# Patient Record
Sex: MALE | Race: WHITE | Employment: OTHER | URBAN - METROPOLITAN AREA
[De-identification: names, ages, dates, MRNs, and addresses within clinical notes are randomized per-mention and may not be internally consistent; named-entity substitution may affect disease eponyms.]

---

## 2017-01-24 ENCOUNTER — TELEPHONE (OUTPATIENT)
Dept: RADIATION THERAPY | Facility: OUTPATIENT CENTER | Age: 82
End: 2017-01-24

## 2017-01-24 NOTE — TELEPHONE ENCOUNTER
Late Entry - outgoing call made to Ramon by scheduling team as he had missed most recent follow up in radiation oncology. Patient stated at this time he is not able to follow up and has other health issues that he feels are more pressing. He does not want to schedule any future follow ups. Scheduling team stated they would notify the doctor of this and asked if the doctor or nurse could call back if there were questions.    MD made aware. He noted that patient should be followed by PCP if he is not able or willing to be seen here. Patient can be seen on an as needed bases.

## 2017-02-07 ENCOUNTER — NURSING HOME VISIT (OUTPATIENT)
Dept: GERIATRICS | Facility: CLINIC | Age: 82
End: 2017-02-07
Payer: COMMERCIAL

## 2017-02-07 VITALS
TEMPERATURE: 98.9 F | SYSTOLIC BLOOD PRESSURE: 109 MMHG | BODY MASS INDEX: 25.68 KG/M2 | HEART RATE: 73 BPM | DIASTOLIC BLOOD PRESSURE: 49 MMHG | WEIGHT: 179 LBS | RESPIRATION RATE: 18 BRPM | OXYGEN SATURATION: 94 %

## 2017-02-07 DIAGNOSIS — L89.150 DECUBITUS ULCER OF COCCYGEAL REGION, UNSTAGEABLE (H): ICD-10-CM

## 2017-02-07 DIAGNOSIS — I25.10 CORONARY ARTERY DISEASE INVOLVING NATIVE CORONARY ARTERY OF NATIVE HEART WITHOUT ANGINA PECTORIS: ICD-10-CM

## 2017-02-07 DIAGNOSIS — Z95.2 S/P TAVR (TRANSCATHETER AORTIC VALVE REPLACEMENT): ICD-10-CM

## 2017-02-07 DIAGNOSIS — I50.21 ACUTE SYSTOLIC CONGESTIVE HEART FAILURE (H): Primary | ICD-10-CM

## 2017-02-07 PROCEDURE — 99306 1ST NF CARE HIGH MDM 50: CPT | Performed by: FAMILY MEDICINE

## 2017-02-07 PROCEDURE — 99207 ZZC CDG-CORRECTLY CODED, REVIEWED AND AGREE: CPT | Performed by: FAMILY MEDICINE

## 2017-02-07 NOTE — PROGRESS NOTES
[facility]  PRIMARY CARE PROVIDER AND CLINIC:  [facility] [address] [facility] [address] [facility] [address]  Chief Complaint   Patient presents with     Hospital F/U     Nursing Home Acute       HPI:    Ramon Mora is a 83 year old  (7/17/1933),admitted to the [facility] from [facility] .  Hospital stay 1 through 2/3/17.  Admitted to this facility for  rehab, medical management and nursing care. Current issues are:      1. Acute systolic congestive heart failure (H)    2. Coronary artery disease involving native coronary artery of native heart without angina pectoris    3. S/P TAVR (transcatheter aortic valve replacement)    4. Decubitus ulcer of coccygeal region, unstageable (H)         Ramon Mora is a 83 y.o. male with a past medical history pertinent for systolic CHF, type II diabetes, hypertension, hyperlipidemia, CKD stage III who is admitted to [facility] on 01/24/17 after presenting to [facility] with 1 month history of leg swelling. He visited his PCP multiple times within the past month for this problem (12/19, 1/4, 1/18) and his diuretic was adjusted each time without relief (most recently on 40mg torsemide BID).     On 1/21, the patient presented to [facility] ED where he was admitted for acute on chronic systolic heart failure. Patient diuresed with 80mg IV lasix q8h and started on clindamycin(?lower extremity cellulitis). Echo showed EF of 10-20% and moderate aortic stenosis (previous echo 9/2016 with EF 27%). Cardiology consulted and patient was transferred to [facility] on 1/24 for bilateral heart catheterization, valve study coronary angiography, graft study, possible PCI as well as PA catheter guided CHF management.      Patient underwent coronary angiogram 1/25 which showed findings consistent with low flow low gradient aortic stenosis with contractile reserve in the setting of ischemic cardiomyopathy and advanced three  vessel CAD. CV surgery and complex valve team consulted for evaluation for valve replacement. Given high risk reoperative AVR, patient's best option was for felt to be a TAVR. Cardiology placed swan on 1/30 to optimize cardiac status periopertaviely,and he underwent right transfemoral transcatheter aortic valve replacement on 1/31. IV diuresis postop was done and patient remained on isordil/hydralazine and nitro infusion. Follow up echo 2/1/17 showed similar LV function to prior and normal prosthetic valve function. Patient was able to wean off intro infusion and converted to oral diuretics upon discharge. Cardiology also added spironolactone and hydralazine(ace inhibitor on hold until sure creatinine is stable) He will continue plavix per cardiology      CODE STATUS/ADVANCE DIRECTIVES DISCUSSION:   DNR / DNI  Patient's living condition: lives with spouse    ALLERGIES:Review of patient's allergies indicates no known allergies.  PAST MEDICAL HISTORY:  has a past medical history of Coronary artery disease; Diabetes mellitus (H); and Malignant neoplasm (H).  PAST SURGICAL HISTORY:  has past surgical history that includes Cardiac surgery; orthopedic surgery (1994); ENT surgery; appendectomy; and Eye surgery.  FAMILY HISTORY: family history includes Cancer - colorectal (age of onset: 64) in his brother.  SOCIAL HISTORY:  reports that he has quit smoking. He has never used smokeless tobacco. He reports that he drinks alcohol. He reports that he does not use illicit drugs.    Post Discharge Medication Reconciliation Status: discharge medications reconciled and changed, per note/orders (see AVS).  Current Outpatient Prescriptions   Medication Sig Dispense Refill     Multiple Vitamins-Minerals (CENTRUM SILVER) per tablet Take 1 tablet by mouth daily       CLOPIDOGREL BISULFATE PO Take 75 mg by mouth daily       FERROUS GLUCONATE PO Take 324 mg by mouth daily (with breakfast)       HYDRALAZINE HCL PO Take 10 mg by mouth every  8 hours       polyethylene glycol (MIRALAX/GLYCOLAX) powder Take 1 capful by mouth daily       SPIRONOLACTONE PO Take 25 mg by mouth daily       TORSEMIDE PO Take 20 mg by mouth daily       triamcinolone (KENALOG) 0.1 % cream Apply topically 2 times daily       Atorvastatin Calcium (LIPITOR PO) Take 40 mg by mouth daily       METOPROLOL SUCCINATE ER PO Take 25 mg by mouth daily        isosorbide mononitrate (IMDUR) 60 MG 24 hr tablet Take 30 mg by mouth daily        nitroglycerin (NITROSTAT) 0.4 MG SL tablet Place  under the tongue every 5 minutes as needed.       pantoprazole sodium (PROTONIX) 40 MG tablet Take 1 packet by mouth daily.       aspirin 81 MG tablet Take  by mouth daily.         ROS:  10 point ROS of systems including Constitutional, Eyes, Respiratory, Cardiovascular, Gastroenterology, Genitourinary, Integumentary, Muscularskeletal, Psychiatric were all negative except for pertinent positives noted in my HPI.    Exam:  /49 mmHg  Pulse 73  Temp(Src) 98.9  F (37.2  C)  Resp 18  Wt 179 lb (81.194 kg)  SpO2 94%    Gen: sitting in chair, alert, cooperative and in no acute distress  HEENT: normocephalic; oropharynx clear  Card: RRR, S1, S2, no murmurs  Resp: lungs clear to auscultation bilaterally, no wheezes or crackles  GI: abdomen soft, not-tender  MSK: normal muscle tone, no LE edema  Neuro: CX II-XII grossly in tact; ROM in all four extremities grossly in tact  Psych: alert and oriented x3; normal affect  Skin: Sacral wound - 1.2 cm x 0.6 x 0.6 cm-- 90% slough, no odor         Lab/Diagnostic data:   Date:  2/6/17  Hgb 10.4, Hct 33.3, WBC 6.5    Date:  2/6/17  Na 140, K+ 4.6, BUN 30, Creat 1.10, eGFR >60  Ca 8.6    Blood sugars-  2/4  115 & 172  2/5  113 & 168         ASSESSMENT/PLAN:  1. Acute systolic congestive heart failure (H)    2. Coronary artery disease involving native coronary artery of native heart without angina pectoris    3. S/P TAVR (transcatheter aortic valve replacement)    4.  Decubitus ulcer of coccygeal region, unstageable (H)          Orders:  1. DC glipizide if still on med.  2. F/U with CV surgery in 4 weeks S/P TAVR Dx Acute Systolic CHF stage 4 NYHA  3. Sacral ulcer- continue wound cares  4. Check CBC, CMP and Mg next lab day Dx CHF  5. HNS glucerna 4 oz po BID to aid in wound healing      Information reviewed:  Medications, vital signs, orders, nursing notes, problem list, hospital information. Total time spent with patient visit was 45 min including patient visit and review of past records. Greater than 50% of total time spent with counseling and coordinating care.    Electronically signed by:  Tami Menjivar MD, MD

## 2017-02-08 ENCOUNTER — TRANSFERRED RECORDS (OUTPATIENT)
Dept: HEALTH INFORMATION MANAGEMENT | Facility: CLINIC | Age: 82
End: 2017-02-08

## 2017-02-08 LAB
ALT SERPL-CCNC: 12 U/L (ref 0–45)
AST SERPL-CCNC: 16 U/L (ref 0–40)
CREAT SERPL-MCNC: 1.16 MG/DL (ref 0.7–1.3)
GFR SERPL CREATININE-BSD FRML MDRD: >60 ML/MIN/1.73M2
GLUCOSE SERPL-MCNC: 101 MG/DL (ref 70–125)
POTASSIUM SERPL-SCNC: 4.4 MMOL/L (ref 3.5–5)

## 2017-02-16 ENCOUNTER — NURSING HOME VISIT (OUTPATIENT)
Dept: GERIATRICS | Facility: CLINIC | Age: 82
End: 2017-02-16
Payer: COMMERCIAL

## 2017-02-16 VITALS
RESPIRATION RATE: 24 BRPM | DIASTOLIC BLOOD PRESSURE: 63 MMHG | SYSTOLIC BLOOD PRESSURE: 130 MMHG | TEMPERATURE: 98 F | BODY MASS INDEX: 25.68 KG/M2 | WEIGHT: 179 LBS | OXYGEN SATURATION: 94 % | HEART RATE: 67 BPM

## 2017-02-16 DIAGNOSIS — I25.10 CORONARY ARTERY DISEASE INVOLVING NATIVE CORONARY ARTERY OF NATIVE HEART WITHOUT ANGINA PECTORIS: ICD-10-CM

## 2017-02-16 DIAGNOSIS — I50.21 ACUTE SYSTOLIC CONGESTIVE HEART FAILURE (H): Primary | ICD-10-CM

## 2017-02-16 DIAGNOSIS — E08.22 DIABETES MELLITUS DUE TO UNDERLYING CONDITION WITH STAGE 3 CHRONIC KIDNEY DISEASE, WITHOUT LONG-TERM CURRENT USE OF INSULIN (H): ICD-10-CM

## 2017-02-16 DIAGNOSIS — L89.150 DECUBITUS ULCER OF COCCYGEAL REGION, UNSTAGEABLE (H): ICD-10-CM

## 2017-02-16 DIAGNOSIS — Z95.2 S/P TAVR (TRANSCATHETER AORTIC VALVE REPLACEMENT): ICD-10-CM

## 2017-02-16 DIAGNOSIS — N18.30 DIABETES MELLITUS DUE TO UNDERLYING CONDITION WITH STAGE 3 CHRONIC KIDNEY DISEASE, WITHOUT LONG-TERM CURRENT USE OF INSULIN (H): ICD-10-CM

## 2017-02-16 PROCEDURE — 99207 ZZC CDG-CORRECTLY CODED, REVIEWED AND AGREE: CPT | Performed by: FAMILY MEDICINE

## 2017-02-16 PROCEDURE — 99316 NF DSCHRG MGMT 30 MIN+: CPT | Performed by: FAMILY MEDICINE

## 2017-02-16 NOTE — PROGRESS NOTES
[facility] DISCHARGE SUMMARY    PATIENT'S NAME: Ramon Mora  YOB: 1933  MEDICAL RECORD NUMBER:  [id number]    PRIMARY CARE PROVIDER AND CLINIC RESPONSIBLE AFTER TRANSFER: [facility] [address] [facility] [address]     CODE STATUS/ADVANCE DIRECTIVES DISCUSSION:   DNR / DNI     No Known Allergies    TRANSFERRING PROVIDERS: CHRISTINE Castillo   DATE OF SNF ADMISSION:  February / 03 / 2017  DATE OF SNF (anticipated) DISCHARGE: February / 16 / 2017  DISCHARGE DISPOSITION: [facility]   [facility]ursing Facility: [facility]  stay 1/24/17 to 2/3/17.     Condition on Discharge:  Improving.  Function:  indpendent   Cognitive Scores: CPT 4.8    Equipment: walker    DISCHARGE DIAGNOSIS:   1. Acute systolic congestive heart failure (H)    2. Coronary artery disease involving native coronary artery of native heart without angina pectoris    3. S/P TAVR (transcatheter aortic valve replacement)        [facility] Course:  Patient was admitted to facility after hospitalization for Acute CHF, Severe Aortic Valve Stenosis ans S/P TAVR  Patient participated in PT/OT. Patient will discharge home with in home services of PT/RN through [facility].      PAST MEDICAL HISTORY:  has a past medical history of Coronary artery disease; Diabetes mellitus (H); and Malignant neoplasm (H).    DISCHARGE MEDICATIONS:  Current Outpatient Prescriptions   Medication Sig Dispense Refill     Multiple Vitamins-Minerals (CENTRUM SILVER) per tablet Take 1 tablet by mouth daily       CLOPIDOGREL BISULFATE PO Take 75 mg by mouth daily       FERROUS GLUCONATE PO Take 324 mg by mouth daily (with breakfast)       HYDRALAZINE HCL PO Take 10 mg by mouth every 8 hours       polyethylene glycol (MIRALAX/GLYCOLAX) powder Take 1 capful by mouth daily       SPIRONOLACTONE PO Take 25 mg by mouth daily       TORSEMIDE PO Take 20 mg  by mouth daily       triamcinolone (KENALOG) 0.1 % cream Apply topically 2 times daily       Atorvastatin Calcium (LIPITOR PO) Take 40 mg by mouth daily       METOPROLOL SUCCINATE ER PO Take 25 mg by mouth daily        isosorbide mononitrate (IMDUR) 60 MG 24 hr tablet Take 30 mg by mouth daily        nitroglycerin (NITROSTAT) 0.4 MG SL tablet Place  under the tongue every 5 minutes as needed.       pantoprazole sodium (PROTONIX) 40 MG tablet Take 1 packet by mouth daily.       aspirin 81 MG tablet Take  by mouth daily.         MEDICATION CHANGES/RATIONALE:   none       ROS:    10 point ROS of systems including Constitutional, Eyes, Respiratory, Cardiovascular, Gastroenterology, Genitourinary, Integumentary, Muscularskeletal, Psychiatric were all negative except for pertinent positives noted in my HPI.    Physical Exam:   Vitals: /63  Pulse 67  Temp 98  F (36.7  C)  Resp 24  Wt 179 lb (81.2 kg)  SpO2 94%  BMI 25.68 kg/m2  BMI= Body mass index is 25.68 kg/(m^2).    Gen: sitting in chair, alert, cooperative and in no acute distress  HEENT: normocephalic; oropharynx clear  Card: RRR, S1, S2, no murmurs  Resp: lungs clear to auscultation bilaterally, no wheezes or crackles  GI: abdomen soft, not-tender  MSK: normal muscle tone, no LE edema  Neuro: CX II-XII grossly in tact; ROM in all four extremities grossly in tact  Psych: alert and oriented x3; normal affect  Skin: Sacral wound - 1.2 cm x 0.6 x 0.6 cm-- 90% slough, no odor       DISCHARGE PLAN:  Physical Therapy, Registered Nurse and From:  [facility]  Patient instructed to follow-up with:  PCP in 1-2 weeks of discharge       [facility] scheduled appointments:      Pending labs: none    SNF labs   Date:  2/8/17  Na 140, K+ 4.4, BUN 31, Creat 1.16, eGFR 60  Ca 8.6        Discharge Treatments: continue with current wound care orders    1. No new orders    TOTAL DISCHARGE TIME:   Greater than 30 minutes  Electronically signed by:  Tami Menjivar,  MD KENNEDY    Documentation of Face to Face and Certification for Home Health Services    I certify that patient: Ramon Mora is under my care and that I, or a nurse practitioner or physician's assistant working with me, had a face-to-face encounter that meets the physician face-to-face encounter requirements with this patient on: 2/16/2017.    This encounter with the patient was in whole, or in part, for the following medical condition, which is the primary reason for home health care: Acute CHF, Severe Aortic Valve Stenosis ans S/P TAVR .    I certify that, based on my findings, the following services are medically necessary home health services: Nursing and Physical Therapy.    My clinical findings support the need for the above services because: Nurse is needed: To provide caregiver training to assist with:  Acute CHF, Severe Aortic Valve Stenosis ans S/P TAVR.. and Physical Therapy Services are needed to assess and treat the following functional impairments:  Acute CHF, Severe Aortic Valve Stenosis ans S/P TAVR.    Further, I certify that my clinical findings support that this patient is homebound (i.e. absences from home require considerable and taxing effort and are for medical reasons or Holiness services or infrequently or of short duration when for other reasons) because: Leaving home is medically contraindicated for the following reason(s): Pressure on open wounds during transport impairs healing process...    Based on the above findings. I certify that this patient is confined to the home and needs intermittent skilled nursing care, physical therapy and/or speech therapy.  The patient is under my care, and I have initiated the establishment of the plan of care.  This patient will be followed by a physician who will periodically review the plan of care.  Physician/Provider to provide follow up care: [facility]    Attending hospital physician (the Medicare certified [facility] provider): Tami Coley  MD Otto  Physician Signature: See electronic signature associated with these discharge orders.  Date: 2/16/2017

## 2018-01-01 ENCOUNTER — OFFICE VISIT (OUTPATIENT)
Dept: DERMATOLOGY | Facility: CLINIC | Age: 83
End: 2018-01-01
Payer: COMMERCIAL

## 2018-01-01 ENCOUNTER — NURSING HOME VISIT (OUTPATIENT)
Dept: FAMILY MEDICINE | Facility: CLINIC | Age: 83
End: 2018-01-01
Payer: COMMERCIAL

## 2018-01-01 ENCOUNTER — DISCHARGE SUMMARY NURSING HOME (OUTPATIENT)
Dept: FAMILY MEDICINE | Facility: CLINIC | Age: 83
End: 2018-01-01
Payer: COMMERCIAL

## 2018-01-01 ENCOUNTER — RESULTS ONLY (OUTPATIENT)
Dept: FAMILY MEDICINE | Facility: CLINIC | Age: 83
End: 2018-01-01

## 2018-01-01 ENCOUNTER — NURSING HOME VISIT (OUTPATIENT)
Dept: GERIATRICS | Facility: CLINIC | Age: 83
End: 2018-01-01
Payer: COMMERCIAL

## 2018-01-01 ENCOUNTER — HOSPITAL LABORATORY (OUTPATIENT)
Facility: OTHER | Age: 83
End: 2018-01-01

## 2018-01-01 VITALS
DIASTOLIC BLOOD PRESSURE: 65 MMHG | RESPIRATION RATE: 20 BRPM | WEIGHT: 171.8 LBS | BODY MASS INDEX: 24.6 KG/M2 | TEMPERATURE: 97.7 F | HEART RATE: 79 BPM | OXYGEN SATURATION: 92 % | SYSTOLIC BLOOD PRESSURE: 116 MMHG | HEIGHT: 70 IN

## 2018-01-01 VITALS
BODY MASS INDEX: 22.76 KG/M2 | OXYGEN SATURATION: 99 % | HEART RATE: 92 BPM | RESPIRATION RATE: 19 BRPM | TEMPERATURE: 97 F | WEIGHT: 158.6 LBS | DIASTOLIC BLOOD PRESSURE: 65 MMHG | SYSTOLIC BLOOD PRESSURE: 145 MMHG

## 2018-01-01 VITALS
DIASTOLIC BLOOD PRESSURE: 51 MMHG | OXYGEN SATURATION: 95 % | HEART RATE: 73 BPM | RESPIRATION RATE: 19 BRPM | BODY MASS INDEX: 24.6 KG/M2 | HEIGHT: 70 IN | WEIGHT: 171.8 LBS | SYSTOLIC BLOOD PRESSURE: 109 MMHG | TEMPERATURE: 96.6 F

## 2018-01-01 VITALS — SYSTOLIC BLOOD PRESSURE: 149 MMHG | OXYGEN SATURATION: 96 % | DIASTOLIC BLOOD PRESSURE: 72 MMHG | HEART RATE: 79 BPM

## 2018-01-01 VITALS
WEIGHT: 150 LBS | TEMPERATURE: 97.9 F | HEART RATE: 81 BPM | SYSTOLIC BLOOD PRESSURE: 104 MMHG | BODY MASS INDEX: 21.52 KG/M2 | RESPIRATION RATE: 16 BRPM | OXYGEN SATURATION: 94 % | DIASTOLIC BLOOD PRESSURE: 47 MMHG

## 2018-01-01 VITALS — HEART RATE: 59 BPM | SYSTOLIC BLOOD PRESSURE: 117 MMHG | OXYGEN SATURATION: 96 % | DIASTOLIC BLOOD PRESSURE: 59 MMHG

## 2018-01-01 VITALS
HEART RATE: 61 BPM | DIASTOLIC BLOOD PRESSURE: 79 MMHG | WEIGHT: 171.8 LBS | RESPIRATION RATE: 18 BRPM | SYSTOLIC BLOOD PRESSURE: 133 MMHG | OXYGEN SATURATION: 95 % | HEIGHT: 70 IN | TEMPERATURE: 98.1 F | BODY MASS INDEX: 24.6 KG/M2

## 2018-01-01 VITALS — SYSTOLIC BLOOD PRESSURE: 130 MMHG | HEART RATE: 64 BPM | OXYGEN SATURATION: 95 % | DIASTOLIC BLOOD PRESSURE: 53 MMHG

## 2018-01-01 VITALS
BODY MASS INDEX: 21.58 KG/M2 | DIASTOLIC BLOOD PRESSURE: 55 MMHG | SYSTOLIC BLOOD PRESSURE: 121 MMHG | HEIGHT: 70 IN | RESPIRATION RATE: 18 BRPM | HEART RATE: 71 BPM | OXYGEN SATURATION: 98 % | WEIGHT: 150.7 LBS | TEMPERATURE: 96.7 F

## 2018-01-01 VITALS
RESPIRATION RATE: 16 BRPM | BODY MASS INDEX: 21.49 KG/M2 | SYSTOLIC BLOOD PRESSURE: 130 MMHG | TEMPERATURE: 98 F | WEIGHT: 149.8 LBS | DIASTOLIC BLOOD PRESSURE: 58 MMHG | HEART RATE: 79 BPM | OXYGEN SATURATION: 95 %

## 2018-01-01 DIAGNOSIS — L89.153 PRESSURE INJURY OF SACRAL REGION, STAGE 3 (H): Primary | ICD-10-CM

## 2018-01-01 DIAGNOSIS — I50.22 CHRONIC SYSTOLIC CHF (CONGESTIVE HEART FAILURE) (H): ICD-10-CM

## 2018-01-01 DIAGNOSIS — G20.C: ICD-10-CM

## 2018-01-01 DIAGNOSIS — F02.80 LATE ONSET ALZHEIMER'S DISEASE WITHOUT BEHAVIORAL DISTURBANCE (H): ICD-10-CM

## 2018-01-01 DIAGNOSIS — L57.0 AK (ACTINIC KERATOSIS): ICD-10-CM

## 2018-01-01 DIAGNOSIS — I50.22 CHRONIC SYSTOLIC CONGESTIVE HEART FAILURE (H): ICD-10-CM

## 2018-01-01 DIAGNOSIS — D04.39 SQUAMOUS CELL CARCINOMA IN SITU OF SKIN OF LEFT CHEEK: Primary | ICD-10-CM

## 2018-01-01 DIAGNOSIS — N18.30 CKD (CHRONIC KIDNEY DISEASE), STAGE III (H): ICD-10-CM

## 2018-01-01 DIAGNOSIS — I25.10 ASHD (ARTERIOSCLEROTIC HEART DISEASE): Primary | ICD-10-CM

## 2018-01-01 DIAGNOSIS — D04.62 SQUAMOUS CELL CARCINOMA IN SITU OF SKIN OF FINGER OF LEFT HAND: ICD-10-CM

## 2018-01-01 DIAGNOSIS — G30.1 LATE ONSET ALZHEIMER'S DISEASE WITHOUT BEHAVIORAL DISTURBANCE (H): ICD-10-CM

## 2018-01-01 DIAGNOSIS — L89.150 DECUBITUS ULCER OF COCCYGEAL REGION, UNSTAGEABLE (H): ICD-10-CM

## 2018-01-01 DIAGNOSIS — E11.8 TYPE 2 DIABETES MELLITUS WITH COMPLICATION, WITHOUT LONG-TERM CURRENT USE OF INSULIN (H): ICD-10-CM

## 2018-01-01 DIAGNOSIS — C61 PROSTATE CANCER (H): ICD-10-CM

## 2018-01-01 DIAGNOSIS — Z48.02 ATTENTION TO DRESSINGS AND SUTURES: Primary | ICD-10-CM

## 2018-01-01 DIAGNOSIS — Z51.5 HOSPICE CARE PATIENT: ICD-10-CM

## 2018-01-01 DIAGNOSIS — N40.1 BENIGN NON-NODULAR PROSTATIC HYPERPLASIA WITH LOWER URINARY TRACT SYMPTOMS: ICD-10-CM

## 2018-01-01 DIAGNOSIS — I25.5 CARDIOMYOPATHY, ISCHEMIC: ICD-10-CM

## 2018-01-01 DIAGNOSIS — Z95.2 S/P TAVR (TRANSCATHETER AORTIC VALVE REPLACEMENT): ICD-10-CM

## 2018-01-01 DIAGNOSIS — I25.10 CORONARY ARTERY DISEASE INVOLVING NATIVE CORONARY ARTERY OF NATIVE HEART WITHOUT ANGINA PECTORIS: Primary | ICD-10-CM

## 2018-01-01 DIAGNOSIS — I25.10 ASHD (ARTERIOSCLEROTIC HEART DISEASE): ICD-10-CM

## 2018-01-01 DIAGNOSIS — R62.7 FAILURE TO THRIVE IN ADULT: ICD-10-CM

## 2018-01-01 DIAGNOSIS — C44.329 SQUAMOUS CELL CARCINOMA OF SKIN OF RIGHT TEMPLE: Primary | ICD-10-CM

## 2018-01-01 DIAGNOSIS — Z51.5 HOSPICE CARE PATIENT: Primary | ICD-10-CM

## 2018-01-01 DIAGNOSIS — Z48.00 ATTENTION TO DRESSINGS AND SUTURES: Primary | ICD-10-CM

## 2018-01-01 DIAGNOSIS — N18.30 DIABETES MELLITUS DUE TO UNDERLYING CONDITION WITH STAGE 3 CHRONIC KIDNEY DISEASE, WITHOUT LONG-TERM CURRENT USE OF INSULIN (H): ICD-10-CM

## 2018-01-01 DIAGNOSIS — G20.A1 PARKINSON'S DISEASE (H): ICD-10-CM

## 2018-01-01 DIAGNOSIS — D48.5 NEOPLASM OF UNCERTAIN BEHAVIOR OF SKIN: Primary | ICD-10-CM

## 2018-01-01 DIAGNOSIS — L89.152 PRESSURE INJURY OF SACRAL REGION, STAGE 2 (H): ICD-10-CM

## 2018-01-01 DIAGNOSIS — Z48.02 ENCOUNTER FOR REMOVAL OF SUTURES: Primary | ICD-10-CM

## 2018-01-01 DIAGNOSIS — R62.7 FAILURE TO THRIVE IN ADULT: Primary | ICD-10-CM

## 2018-01-01 DIAGNOSIS — I95.1: ICD-10-CM

## 2018-01-01 DIAGNOSIS — E11.8 TYPE 2 DIABETES MELLITUS WITH COMPLICATION, WITHOUT LONG-TERM CURRENT USE OF INSULIN (H): Primary | ICD-10-CM

## 2018-01-01 DIAGNOSIS — E08.22 DIABETES MELLITUS DUE TO UNDERLYING CONDITION WITH STAGE 3 CHRONIC KIDNEY DISEASE, WITHOUT LONG-TERM CURRENT USE OF INSULIN (H): ICD-10-CM

## 2018-01-01 DIAGNOSIS — N13.9 OBSTRUCTIVE UROPATHY: ICD-10-CM

## 2018-01-01 LAB
ALBUMIN UR-MCNC: NEGATIVE MG/DL
ALBUMIN UR-MCNC: NORMAL MG/DL
APPEARANCE UR: CLEAR
APPEARANCE UR: NORMAL
BACTERIA SPEC CULT: NO GROWTH
BACTERIA SPEC CULT: NORMAL
BILIRUB UR QL STRIP: NEGATIVE
BILIRUB UR QL STRIP: NORMAL
COLOR UR AUTO: NORMAL
COLOR UR AUTO: YELLOW
COPATH REPORT: NORMAL
GLUCOSE UR STRIP-MCNC: NEGATIVE MG/DL
GLUCOSE UR STRIP-MCNC: NORMAL MG/DL
HGB UR QL STRIP: NEGATIVE
HGB UR QL STRIP: NORMAL
HYALINE CASTS #/AREA URNS LPF: 27 /LPF (ref 0–2)
HYALINE CASTS #/AREA URNS LPF: NORMAL /LPF (ref 0–2)
KETONES UR STRIP-MCNC: NEGATIVE MG/DL
KETONES UR STRIP-MCNC: NORMAL MG/DL
LEUKOCYTE ESTERASE UR QL STRIP: NEGATIVE
LEUKOCYTE ESTERASE UR QL STRIP: NORMAL
Lab: NORMAL
NITRATE UR QL: NEGATIVE
NITRATE UR QL: NORMAL
PH UR STRIP: 6 PH (ref 5–7)
PH UR STRIP: NORMAL PH (ref 5–7)
RBC #/AREA URNS AUTO: 2 /HPF (ref 0–2)
RBC #/AREA URNS AUTO: NORMAL /HPF (ref 0–2)
SOURCE: NORMAL
SOURCE: NORMAL
SP GR UR STRIP: 1.01 (ref 1–1.03)
SP GR UR STRIP: NORMAL (ref 1–1.03)
SPECIMEN SOURCE: NORMAL
SPECIMEN SOURCE: NORMAL
SQUAMOUS #/AREA URNS AUTO: <1 /HPF (ref 0–1)
SQUAMOUS #/AREA URNS AUTO: NORMAL /HPF (ref 0–1)
UROBILINOGEN UR STRIP-MCNC: NORMAL MG/DL (ref 0–2)
UROBILINOGEN UR STRIP-MCNC: NORMAL MG/DL (ref 0–2)
WBC #/AREA URNS AUTO: <1 /HPF (ref 0–5)
WBC #/AREA URNS AUTO: NORMAL /HPF

## 2018-01-01 PROCEDURE — 99309 SBSQ NF CARE MODERATE MDM 30: CPT | Performed by: NURSE PRACTITIONER

## 2018-01-01 PROCEDURE — 99310 SBSQ NF CARE HIGH MDM 45: CPT | Performed by: NURSE PRACTITIONER

## 2018-01-01 PROCEDURE — 99207 ZZC NO CHARGE NURSE ONLY: CPT

## 2018-01-01 PROCEDURE — 11100 HC BIOPSY SKIN/SUBQ/MUC MEM, SINGLE LESION: CPT | Performed by: PHYSICIAN ASSISTANT

## 2018-01-01 PROCEDURE — 99316 NF DSCHRG MGMT 30 MIN+: CPT | Performed by: NURSE PRACTITIONER

## 2018-01-01 PROCEDURE — 17311 MOHS 1 STAGE H/N/HF/G: CPT | Performed by: DERMATOLOGY

## 2018-01-01 PROCEDURE — 11101 HC BIOPSY SKIN/SUBQ/MUC MEM, EACH ADDTL LESION: CPT | Performed by: PHYSICIAN ASSISTANT

## 2018-01-01 PROCEDURE — 99305 1ST NF CARE MODERATE MDM 35: CPT | Performed by: FAMILY MEDICINE

## 2018-01-01 PROCEDURE — 17311 MOHS 1 STAGE H/N/HF/G: CPT | Mod: 79 | Performed by: DERMATOLOGY

## 2018-01-01 PROCEDURE — 13132 CMPLX RPR F/C/C/M/N/AX/G/H/F: CPT | Mod: 51 | Performed by: DERMATOLOGY

## 2018-01-01 PROCEDURE — 17311 MOHS 1 STAGE H/N/HF/G: CPT | Mod: 59 | Performed by: DERMATOLOGY

## 2018-01-01 PROCEDURE — 11101: CPT | Performed by: DERMATOLOGY

## 2018-01-01 PROCEDURE — 88331 PATH CONSLTJ SURG 1 BLK 1SPC: CPT | Mod: 59 | Performed by: DERMATOLOGY

## 2018-01-01 PROCEDURE — 99308 SBSQ NF CARE LOW MDM 20: CPT | Performed by: NURSE PRACTITIONER

## 2018-01-01 PROCEDURE — 13132 CMPLX RPR F/C/C/M/N/AX/G/H/F: CPT | Mod: 79 | Performed by: DERMATOLOGY

## 2018-01-01 PROCEDURE — 11100 HC REPAIR COMPLEX, WOUND HEAD/AXIL/GEN/HND/FT 2.6-7.5 CM: CPT | Mod: 59 | Performed by: DERMATOLOGY

## 2018-01-01 PROCEDURE — 99214 OFFICE O/P EST MOD 30 MIN: CPT | Mod: 25 | Performed by: PHYSICIAN ASSISTANT

## 2018-01-01 PROCEDURE — 88305 TISSUE EXAM BY PATHOLOGIST: CPT | Mod: TC | Performed by: PHYSICIAN ASSISTANT

## 2018-01-01 RX ORDER — OMEPRAZOLE 20 MG/1
20 TABLET, DELAYED RELEASE ORAL DAILY
COMMUNITY

## 2018-01-01 RX ORDER — BISACODYL 10 MG
10 SUPPOSITORY, RECTAL RECTAL DAILY PRN
COMMUNITY

## 2018-01-01 RX ORDER — NYSTATIN 100000 [USP'U]/G
POWDER TOPICAL
COMMUNITY

## 2018-01-01 RX ORDER — DONEPEZIL HYDROCHLORIDE 5 MG/1
5 TABLET, FILM COATED ORAL AT BEDTIME
COMMUNITY
End: 2018-01-01

## 2018-01-01 RX ORDER — ACETAMINOPHEN 650 MG/1
650 SUPPOSITORY RECTAL EVERY 6 HOURS PRN
COMMUNITY

## 2018-01-01 RX ORDER — MORPHINE SULFATE 30 MG/1
2.5 TABLET ORAL
COMMUNITY
End: 2018-01-01

## 2018-01-01 RX ORDER — SENNOSIDES 8.6 MG
1 TABLET ORAL 2 TIMES DAILY
COMMUNITY

## 2018-01-01 ASSESSMENT — MIFFLIN-ST. JEOR: SCORE: 1374.82

## 2018-06-06 NOTE — PATIENT INSTRUCTIONS
Wound Care Instructions     FOR SUPERFICIAL WOUNDS     [facility] [phone]    [facility] [phone]                       AFTER 24 HOURS YOU SHOULD REMOVE THE BANDAGE AND BEGIN DAILY DRESSING CHANGES AS FOLLOWS:     1) Remove Dressing.     2) Clean and dry the area with tap water using a Q-tip or sterile gauze pad.     3) Apply Vaseline, Aquaphor, Polysporin ointment or Bacitracin ointment over entire wound.  Do NOT use Neosporin ointment.     4) Cover the wound with a band-aid, or a sterile non-stick gauze pad and micropore paper tape      REPEAT THESE INSTRUCTIONS AT LEAST ONCE A DAY UNTIL THE WOUND HAS COMPLETELY HEALED.    It is an old wives tale that a wound heals better when it is exposed to air and allowed to dry out. The wound will heal faster with a better cosmetic result if it is kept moist with ointment and covered with a bandage.    **Do not let the wound dry out.**      Supplies Needed:      *Cotton tipped applicators (Q-tips)    *Polysporin Ointment or Bacitracin Ointment (NOT NEOSPORIN)    *Band-aids or non-stick gauze pads and micropore paper tape.      PATIENT INFORMATION:    During the healing process you will notice a number of changes. All wounds develop a small halo of redness surrounding the wound.  This means healing is occurring. Severe itching with extensive redness usually indicates sensitivity to the ointment or bandage tape used to dress the wound.  You should call our office if this develops.      Swelling  and/or discoloration around your surgical site is common, particularly when performed around the eye.    All wounds normally drain.  The larger the wound the more drainage there will be.  After 7-10 days, you will notice the wound beginning to shrink and new skin will begin to grow.  The wound is healed when you can see skin has formed over the entire area.  A healed wound has a healthy, shiny look to the surface and is red to dark pink in color  to normalize.  Wounds may take approximately 4-6 weeks to heal.  Larger wounds may take 6-8 weeks.  After the wound is healed you may discontinue dressing changes.    You may experience a sensation of tightness as your wound heals. This is normal and will gradually subside.    Your healed wound may be sensitive to temperature changes. This sensitivity improves with time, but if you re having a lot of discomfort, try to avoid temperature extremes.    Patients frequently experience itching after their wound appears to have healed because of the continue healing under the skin.  Plain Vaseline will help relieve the itching.        POSSIBLE COMPLICATIONS    BLEEDIN. Leave the bandage in place.  2. Use tightly rolled up gauze or a cloth to apply direct pressure over the bandage for 30  minutes.  3. Reapply pressure for an additional 30 minutes if necessary  4. Use additional gauze and tape to maintain pressure once the bleeding has stopped.

## 2018-06-06 NOTE — PROGRESS NOTES
Ramon Mora is a 84 year old year old male patient here today for spots on face. Patient has spot on right temple.  Patient states this has been present for 4-6 months.  Patient reports the following symptoms: not pain, but he will find himself picking at spot.  Patient reports that he has tried his scalp with efudex a few years ago, but does not want to use this again.  Patient has no other skin complaints today.  Remainder of the HPI, Meds, PMH, Allergies, FH, and SH was reviewed in chart.    Pertinent Hx:   History of BCC   Past Medical History:   Diagnosis Date     Coronary artery disease      Diabetes mellitus (H)      Malignant neoplasm (H)     basal cell carcinoma       Past Surgical History:   Procedure Laterality Date     APPENDECTOMY       CARDIAC SURGERY      CABG     ENT SURGERY      tonsillectomy     EYE SURGERY      cataracts     ORTHOPEDIC SURGERY  1994    knee rodding        Family History   Problem Relation Age of Onset     Cancer - colorectal Brother 64       Social History     Social History     Marital status:      Spouse name: N/A     Number of children: N/A     Years of education: N/A     Occupational History     Not on file.     Social History Main Topics     Smoking status: Former Smoker     Smokeless tobacco: Never Used     Alcohol use Yes      Comment: rare     Drug use: No     Sexual activity: Not on file     Other Topics Concern     Not on file     Social History Narrative       Outpatient Encounter Prescriptions as of 6/6/2018   Medication Sig Dispense Refill     aspirin 81 MG tablet Take  by mouth daily.       Atorvastatin Calcium (LIPITOR PO) Take 40 mg by mouth daily       HYDRALAZINE HCL PO Take 10 mg by mouth every 8 hours       isosorbide mononitrate (IMDUR) 60 MG 24 hr tablet Take 30 mg by mouth daily        METOPROLOL SUCCINATE ER PO Take 25 mg by mouth daily        Multiple Vitamins-Minerals (CENTRUM SILVER) per tablet Take 1 tablet by mouth daily       pantoprazole  sodium (PROTONIX) 40 MG tablet Take 1 packet by mouth daily.       CLOPIDOGREL BISULFATE PO Take 75 mg by mouth daily       FERROUS GLUCONATE PO Take 324 mg by mouth daily (with breakfast)       nitroglycerin (NITROSTAT) 0.4 MG SL tablet Place  under the tongue every 5 minutes as needed.       polyethylene glycol (MIRALAX/GLYCOLAX) powder Take 1 capful by mouth daily       SPIRONOLACTONE PO Take 25 mg by mouth daily       TORSEMIDE PO Take 20 mg by mouth daily       triamcinolone (KENALOG) 0.1 % cream Apply topically 2 times daily       No facility-administered encounter medications on file as of 6/6/2018.              Review Of Systems  Skin: As above  Eyes: negative  Ears/Nose/Throat: negative  Respiratory: No shortness of breath, dyspnea on exertion, cough, or hemoptysis  Cardiovascular: negative  Gastrointestinal: negative  Genitourinary: negative  Musculoskeletal: negative  Neurologic: negative  Psychiatric: negative  Hematologic/Lymphatic/Immunologic: negative  Endocrine: negative      O:   NAD, WDWN, Alert & Oriented, Mood & Affect wnl, Vitals stable   Here today with his wife    /72  Pulse 79  SpO2 96%   General appearance normal   Vitals stable   Alert, oriented and in no acute distress     Numerous pink gritty papules and plaques on scalp and face  1.0 cm pink scaly papule on right temple   2.1 cm pink scaly plaque on left mid cheek       Eyes: Conjunctivae/lids:Normal     ENT: Lips: normal    MSK:Normal    Pulm: Breathing Normal    Neuro/Psych: Orientation:Normal; Mood/Affect:Normal  A/P:  1. R/O NMSC on right temple and left mid cheek   TANGENTIAL BIOPSY SENT OUT:  After consent, anesthesia with LEC and prep, tangential excision performed and specimen sent out for permanent section histology.  No complications and routine wound care. Patient told to call our office in 1-2 weeks for result.      2. Actinic keratoses  Patient prefers not to treat at this time.   Signs and Symptoms of skin cancer  discussed with patient.  ABCDEs of melanoma reviewed with patient.  Patient encouraged to perform monthly skin exams.  UV precautions reviewed with patient  Risks of non-melanoma skin cancer discussed with patient   Return to clinic pending biopsy results.

## 2018-06-06 NOTE — LETTER
6/6/2018         RE: Ramon Mora  [id number] [facility] [address]        Dear Colleague,    Thank you for referring your patient, Ramon Mora, to the [facility]. Please see a copy of my visit note below.    Ramon Mora is a 84 year old year old male patient here today for spots on face. Patient has spot on right temple.  Patient states this has been present for 4-6 months.  Patient reports the following symptoms: not pain, but he will find himself picking at spot.  Patient reports that he has tried his scalp with efudex a few years ago, but does not want to use this again.  Patient has no other skin complaints today.  Remainder of the HPI, Meds, PMH, Allergies, FH, and SH was reviewed in chart.    Pertinent Hx:   History of BCC   Past Medical History:   Diagnosis Date     Coronary artery disease      Diabetes mellitus (H)      Malignant neoplasm (H)     basal cell carcinoma       Past Surgical History:   Procedure Laterality Date     APPENDECTOMY       CARDIAC SURGERY      CABG     ENT SURGERY      tonsillectomy     EYE SURGERY      cataracts     ORTHOPEDIC SURGERY  1994    knee rodding        Family History   Problem Relation Age of Onset     Cancer - colorectal Brother 64       Social History     Social History     Marital status:      Spouse name: N/A     Number of children: N/A     Years of education: N/A     Occupational History     Not on file.     Social History Main Topics     Smoking status: Former Smoker     Smokeless tobacco: Never Used     Alcohol use Yes      Comment: rare     Drug use: No     Sexual activity: Not on file     Other Topics Concern     Not on file     Social History Narrative       Outpatient Encounter Prescriptions as of 6/6/2018   Medication Sig Dispense Refill     aspirin 81 MG tablet Take  by mouth daily.       Atorvastatin Calcium (LIPITOR PO) Take 40 mg by mouth daily       HYDRALAZINE HCL PO Take 10 mg by mouth every 8 hours        isosorbide mononitrate (IMDUR) 60 MG 24 hr tablet Take 30 mg by mouth daily        METOPROLOL SUCCINATE ER PO Take 25 mg by mouth daily        Multiple Vitamins-Minerals (CENTRUM SILVER) per tablet Take 1 tablet by mouth daily       pantoprazole sodium (PROTONIX) 40 MG tablet Take 1 packet by mouth daily.       CLOPIDOGREL BISULFATE PO Take 75 mg by mouth daily       FERROUS GLUCONATE PO Take 324 mg by mouth daily (with breakfast)       nitroglycerin (NITROSTAT) 0.4 MG SL tablet Place  under the tongue every 5 minutes as needed.       polyethylene glycol (MIRALAX/GLYCOLAX) powder Take 1 capful by mouth daily       SPIRONOLACTONE PO Take 25 mg by mouth daily       TORSEMIDE PO Take 20 mg by mouth daily       triamcinolone (KENALOG) 0.1 % cream Apply topically 2 times daily       No facility-administered encounter medications on file as of 6/6/2018.              Review Of Systems  Skin: As above  Eyes: negative  Ears/Nose/Throat: negative  Respiratory: No shortness of breath, dyspnea on exertion, cough, or hemoptysis  Cardiovascular: negative  Gastrointestinal: negative  Genitourinary: negative  Musculoskeletal: negative  Neurologic: negative  Psychiatric: negative  Hematologic/Lymphatic/Immunologic: negative  Endocrine: negative      O:   NAD, WDWN, Alert & Oriented, Mood & Affect wnl, Vitals stable   Here today with his wife    /72  Pulse 79  SpO2 96%   General appearance normal   Vitals stable   Alert, oriented and in no acute distress     Numerous pink gritty papules and plaques on scalp and face  1.0 cm pink scaly papule on right temple   2.1 cm pink scaly plaque on left mid cheek       Eyes: Conjunctivae/lids:Normal     ENT: Lips: normal    MSK:Normal    Pulm: Breathing Normal    Neuro/Psych: Orientation:Normal; Mood/Affect:Normal  A/P:  1. R/O NMSC on right temple and left mid cheek   TANGENTIAL BIOPSY SENT OUT:  After consent, anesthesia with LEC and prep, tangential excision performed and specimen  sent out for permanent section histology.  No complications and routine wound care. Patient told to call our office in 1-2 weeks for result.      2. Actinic keratoses  Patient prefers not to treat at this time.   Signs and Symptoms of skin cancer discussed with patient.  ABCDEs of melanoma reviewed with patient.  Patient encouraged to perform monthly skin exams.  UV precautions reviewed with patient  Risks of non-melanoma skin cancer discussed with patient   Return to clinic pending biopsy results.     Again, thank you for allowing me to participate in the care of your patient.        Sincerely,        Ana Whitman PA-C

## 2018-06-06 NOTE — MR AVS SNAPSHOT
After Visit Summary   6/6/2018    Ramon Mora    MRN: [id number]           Patient Information     Date Of Birth          7/17/1933        Visit Information        Provider Department      6/6/2018 3:40 PM Ana Deleon PA-C [facility]        Care Instructions          Wound Care Instructions     FOR SUPERFICIAL WOUNDS     [facility] [phone]    [facility] [phone]                       AFTER 24 HOURS YOU SHOULD REMOVE THE BANDAGE AND BEGIN DAILY DRESSING CHANGES AS FOLLOWS:     1) Remove Dressing.     2) Clean and dry the area with tap water using a Q-tip or sterile gauze pad.     3) Apply Vaseline, Aquaphor, Polysporin ointment or Bacitracin ointment over entire wound.  Do NOT use Neosporin ointment.     4) Cover the wound with a band-aid, or a sterile non-stick gauze pad and micropore paper tape      REPEAT THESE INSTRUCTIONS AT LEAST ONCE A DAY UNTIL THE WOUND HAS COMPLETELY HEALED.    It is an old wives tale that a wound heals better when it is exposed to air and allowed to dry out. The wound will heal faster with a better cosmetic result if it is kept moist with ointment and covered with a bandage.    **Do not let the wound dry out.**      Supplies Needed:      *Cotton tipped applicators (Q-tips)    *Polysporin Ointment or Bacitracin Ointment (NOT NEOSPORIN)    *Band-aids or non-stick gauze pads and micropore paper tape.      PATIENT INFORMATION:    During the healing process you will notice a number of changes. All wounds develop a small halo of redness surrounding the wound.  This means healing is occurring. Severe itching with extensive redness usually indicates sensitivity to the ointment or bandage tape used to dress the wound.  You should call our office if this develops.      Swelling  and/or discoloration around your surgical site is common, particularly when performed around the eye.    All wounds normally drain.  The larger  the wound the more drainage there will be.  After 7-10 days, you will notice the wound beginning to shrink and new skin will begin to grow.  The wound is healed when you can see skin has formed over the entire area.  A healed wound has a healthy, shiny look to the surface and is red to dark pink in color to normalize.  Wounds may take approximately 4-6 weeks to heal.  Larger wounds may take 6-8 weeks.  After the wound is healed you may discontinue dressing changes.    You may experience a sensation of tightness as your wound heals. This is normal and will gradually subside.    Your healed wound may be sensitive to temperature changes. This sensitivity improves with time, but if you re having a lot of discomfort, try to avoid temperature extremes.    Patients frequently experience itching after their wound appears to have healed because of the continue healing under the skin.  Plain Vaseline will help relieve the itching.        POSSIBLE COMPLICATIONS    BLEEDIN. Leave the bandage in place.  2. Use tightly rolled up gauze or a cloth to apply direct pressure over the bandage for 30  minutes.  3. Reapply pressure for an additional 30 minutes if necessary  4. Use additional gauze and tape to maintain pressure once the bleeding has stopped.            Follow-ups after your visit        Who to contact     If you have questions or need follow up information about today's clinic visit or your schedule please contact [facility] directly at [phone].  Normal or non-critical lab and imaging results will be communicated to you by InSequenthart, letter or phone within 4 business days after the clinic has received the results. If you do not hear from us within 7 days, please contact the clinic through MyChart or phone. If you have a critical or abnormal lab result, we will notify you by phone as soon as possible.  Submit refill requests through [employer] or call your pharmacy and they will forward the refill  request to us. Please allow 3 business days for your refill to be completed.          Additional Information About Your Visit        Care EveryWhere ID     This is your Care EveryWhere ID. This could be used by other organizations to access your [facility] medical records  [id number]        Your Vitals Were     Pulse Pulse Oximetry                79 96%           Blood Pressure from Last 3 Encounters:   06/06/18 149/72   02/16/17 130/63   02/07/17 109/49    Weight from Last 3 Encounters:   02/16/17 81.2 kg (179 lb)   02/07/17 81.2 kg (179 lb)   02/05/17 80.7 kg (178 lb)              Today, you had the following     No orders found for display       Primary Care Provider Office Phone # Fax #    Rex GONZÁLES Jeremy [phone]       [place] [id number]        Equal Access to Services     JAYCEE TENORIO : Hadii aad ku hadasho Socelso, waaxda luqadaha, qaybta kaalmada robin, angelika guevara . So [facility] [phone].    ATENCIÓN: Si habla español, tiene a carver disposición servicios gratuitos de asistencia lingüística. Mariajose al [phone].    We comply with applicable federal civil rights laws and [place] laws. We do not discriminate on the basis of race, color, national origin, age, disability, sex, sexual orientation, or gender identity.            Thank you!     Thank you for choosing [facility]  for your care. Our goal is always to provide you with excellent care. Hearing back from our patients is one way we can continue to improve our services. Please take a few minutes to complete the written survey that you may receive in the mail after your visit with us. Thank you!             Your Updated Medication List - Protect others around you: Learn how to safely use, store and throw away your medicines at www.disposemymeds.org.          This list is accurate as of 6/6/18  4:03 PM.  Always use your most recent med list.                    Brand Name Dispense Instructions for use Diagnosis    aspirin 81 MG tablet      Take  by mouth daily.        CENTRUM SILVER per tablet      Take 1 tablet by mouth daily        CLOPIDOGREL BISULFATE PO      Take 75 mg by mouth daily        FERROUS GLUCONATE PO      Take 324 mg by mouth daily (with breakfast)        HYDRALAZINE HCL PO      Take 10 mg by mouth every 8 hours        isosorbide mononitrate 60 MG 24 hr tablet    IMDUR     Take 30 mg by mouth daily        LIPITOR PO      Take 40 mg by mouth daily        METOPROLOL SUCCINATE ER PO      Take 25 mg by mouth daily        nitroGLYcerin 0.4 MG sublingual tablet    NITROSTAT     Place  under the tongue every 5 minutes as needed.        polyethylene glycol powder    MIRALAX/GLYCOLAX     Take 1 capful by mouth daily        PROTONIX 40 MG packet   Generic drug:  pantoprazole sodium      Take 1 packet by mouth daily.        SPIRONOLACTONE PO      Take 25 mg by mouth daily        TORSEMIDE PO      Take 20 mg by mouth daily        triamcinolone 0.1 % cream    KENALOG     Apply topically 2 times daily

## 2018-07-16 NOTE — NURSING NOTE
Chief Complaint   Patient presents with     Derm Problem     mohs       Vitals:    07/16/18 0759   BP: 130/53   Pulse: 64   SpO2: 95%     Wt Readings from Last 1 Encounters:   02/16/17 81.2 kg (179 lb)     Tereza Wilson LPN.................7/16/2018

## 2018-07-16 NOTE — PROGRESS NOTES
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  Today he notes tender growths on left hand.   .  Patient states this has been present for a whilew.  Patient reports the following symptoms:  tender.  Patient reports the following previous treatments cryo.  Patient reports the following modifying factors none.  Associated symptoms: none.  Patient has no other skin complaints today.  Remainder of the HPI, Meds, PMH, Allergies, FH, and SH was reviewed in chart.      Past Medical History:   Diagnosis Date     Coronary artery disease      Diabetes mellitus (H)      Malignant neoplasm (H)     basal cell carcinoma     Squamous cell carcinoma        Past Surgical History:   Procedure Laterality Date     APPENDECTOMY       CARDIAC SURGERY      CABG     ENT SURGERY      tonsillectomy     EYE SURGERY      cataracts     ORTHOPEDIC SURGERY  1994    knee rodding        Family History   Problem Relation Age of Onset     Cancer - colorectal Brother 64       Social History     Social History     Marital status:      Spouse name: N/A     Number of children: N/A     Years of education: N/A     Occupational History     Not on file.     Social History Main Topics     Smoking status: Former Smoker     Smokeless tobacco: Never Used     Alcohol use Yes      Comment: rare     Drug use: No     Sexual activity: Not on file     Other Topics Concern     Not on file     Social History Narrative       Outpatient Encounter Prescriptions as of 7/16/2018   Medication Sig Dispense Refill     aspirin 81 MG tablet Take  by mouth daily.       Atorvastatin Calcium (LIPITOR PO) Take 40 mg by mouth daily       CLOPIDOGREL BISULFATE PO Take 75 mg by mouth daily       FERROUS GLUCONATE PO Take 324 mg by mouth daily (with breakfast)       HYDRALAZINE HCL PO Take 10 mg by mouth every 8 hours       isosorbide mononitrate (IMDUR) 60 MG 24 hr tablet Take 30 mg by  mouth daily        METOPROLOL SUCCINATE ER PO Take 25 mg by mouth daily        Multiple Vitamins-Minerals (CENTRUM SILVER) per tablet Take 1 tablet by mouth daily       nitroglycerin (NITROSTAT) 0.4 MG SL tablet Place  under the tongue every 5 minutes as needed.       pantoprazole sodium (PROTONIX) 40 MG tablet Take 1 packet by mouth daily.       polyethylene glycol (MIRALAX/GLYCOLAX) powder Take 1 capful by mouth daily       SPIRONOLACTONE PO Take 25 mg by mouth daily       TORSEMIDE PO Take 20 mg by mouth daily       triamcinolone (KENALOG) 0.1 % cream Apply topically 2 times daily       No facility-administered encounter medications on file as of 7/16/2018.              Review Of Systems  Skin: As above  Eyes: negative  Ears/Nose/Throat: negative  Respiratory: No shortness of breath, dyspnea on exertion, cough, or hemoptysis  Cardiovascular: negative  Gastrointestinal: negative  Genitourinary: negative  Musculoskeletal: negative  Neurologic: negative  Psychiatric: negative  Hematologic/Lymphatic/Immunologic: negative  Endocrine: negative      O:   NAD, WDWN, Alert & Oriented, Mood & Affect wnl, Vitals stable   Here today alone   /53  Pulse 64  SpO2 95%   General appearance normal   Vitals stable   Alert, oriented and in no acute distress      Following lymph nodes palpated: Occipital, Cervical, Supraclavicular , no lad   R temple 1cm red scaly papule    L 1st MCP 9mm ill-deifned crusted scaly papule    L 2nd PIP 1.3cm ill-deifned red scaly papule   Gritty p[apules on face      Eyes: Conjunctivae/lids:Normal     ENT: Lips, buccal mucosa, tongue: normal    MSK:Normal    Cardiovascular: peripheral edema none    Pulm: Breathing Normal    Lymph Nodes: No Head and Neck Lymphadenopathy     Neuro/Psych: Orientation:Normal; Mood/Affect:Normal      MICRO:   L 1st MCPO:There is hyperkeratosis & parakeratosis of the epidermis, with full thickness epidermal involvement by atypical keratinocytes with rare pale  vacuolated cells.  Unremarkable dermis.    L 2nd PIP:There is hyperkeratosis & parakeratosis of the epidermis, with full thickness epidermal involvement by atypical keratinocytes with rare pale vacuolated cells.  Unremarkable dermis.    A/P:  1. Actinic keratosis on face  Efudex thi swinter  2. Squamous cell carcinoma temple  3. R/o squamous cell carcinoma fingers  TANGENTIAL BIOPSY IN HOUSE:  After consent, anesthesia with LEC and prep, tangential excision performed and dx above confirmed with frozen section histology.  No complications and routine wound care.  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  I explained that treatment of these lesions would be purely cosmetic and not medically neccessary.  I discussed with patient different removal options including excision, cautery and /or laser.      Nature and genetics of benign skin lesions dicussed with patient.  Signs and Symptoms of skin cancer discussed with patient.  Patient encouraged to perform monthly skin exams.  UV precautions reviewed with patient.  Patient to follow up with Primary Care provider regarding elevated blood pressure.  Skin care regimen reviewed with patient: Eliminate harsh soaps, i.e. Dial, zest, irsih spring; Mild soaps such as Cetaphil or Dove sensitive skin, avoid hot or cold showers, aggressive use of emollients including vanicream, cetaphil or cerave discussed with patient.    Risks of non-melanoma skin cancer discussed with patient   Return to clinic 6 months    PROCEDURE NOTE  R temple squamous cell carcinoma   MOHS:   Location    After PGACAC discussed with patient, decision for Mohs surgery was made. Indication for Mohs was Location. Patient confirmed the site with Dr. Lea.  After anesthesia with LEC, the tumor was excised using standard  Mohs technique in 1 stages(s).  CLEAR MARGINS OBTAINED and Final defect size was 1.4 cm.       REPAIR COMPLEX: Because of the tightness of the surrounding skin and Because of the size and full thickness nature of the defect, a complex closure was planned. After LEC anesthesia and prep, Burow's triangles were excised in the relaxed skin tension lines. The wound edges were widely undermined by dissection in the subcutaneous plane until adequate tissue mobility was obtained. Hemostasis was obtained. The wound edges were closed in a layered fashion using Vicryl and Fast Absorbing Plain Gut sutures. Postoperative length was 4 cm.   EBL minimal; complications none; wound care routine.  The patient was discharged in good condition and will return in one week for wound evaluation.    L 1st mcp squamous cell carcinoma in situ  MOHS:   Recurrent and Ill-defined margins    After PGACAC discussed with patient, decision for Mohs surgery was made. Indication for Mohs was Recurrent and Ill-defined margins. Patient confirmed the site with Dr. Lea.  After anesthesia with LEC, the tumor was excised using standard Mohs technique in 1 stages(s).  CLEAR MARGINS OBTAINED and Final defect size was 1.5 cm.       REPAIR SECOND INTENT: We discussed the options for wound management in full with the patient including risks/benefits/possible outcomes. Decision made to allow the wound to heal by second intention. EBL minimal; complications none; wound care routine.  The patient was discharged in good condition and will return in one month or prn for wound evaluation.    L 2nd PIP squamous cell carcinoma in situ  MOHS:   Recurrent and Ill-defined margins    After PGACAC discussed with patient, decision for Mohs surgery was made. Indication for Mohs was Recurrent and Ill-defined margins. Patient confirmed the site with Dr. Lea.  After anesthesia with LEC, the tumor was excised using standard Mohs technique in 1 stages(s).  CLEAR MARGINS  OBTAINED and Final defect size was 1.7 cm.       REPAIR SECOND INTENT: We discussed the options for wound management in full with the patient including risks/benefits/possible outcomes. Decision made to allow the wound to heal by second intention. EBL minimal; complications none; wound care routine.  The patient was discharged in good condition and will return in one month or prn for wound evaluation.

## 2018-07-16 NOTE — PATIENT INSTRUCTIONS
Sutured Wound Care     [facility]: [phone]    [facility]: [phone]          ? No strenuous activity for 48 hours. Resume moderate activity in 48 hours. No heavy exercising until you are seen for follow up in one week.     ? Take Tylenol as needed for discomfort.                         ? Do not drink alcoholic beverages for 48 hours.     ? Keep the pressure bandage in place for 24 hours. If the bandage becomes blood tinged or loose, reinforce it with gauze and tape.        (Refer to the reverse side of this page for management of bleeding).    ? Remove pressure bandage in 24 hours     ? Leave the flat bandage in place until your follow up appointment.    ? Keep the bandage dry. Wash around it carefully.    ? If the tape becomes soiled or starts to come off, reinforce it with additional paper tape.    ? Do not smoke for 3 weeks; smoking is detrimental to wound healing.    ? It is normal to have swelling and bruising around the surgical site. The bruising will fade in approximately 10-14 days. Elevate the area to reduce swelling.    ? Numbness, itchiness and sensitivity to temperature changes can occur after surgery and may take up to 18 months to normalize.      POSSIBLE COMPLICATIONS    BLEEDIN. Leave the bandage in place.  2. Use tightly rolled up gauze or a cloth to apply direct pressure over the bandage for 20   minutes.  3. Reapply pressure for an additional 20 minutes if necessary  4. Call the office or go to the nearest emergency room if pressure fails to stop the bleeding.  5. Use additional gauze and tape to maintain pressure once the bleeding has stopped.        PAIN:    1. Post operative pain should slowly get better, never worse.  2. A severe increase in pain may indicate a problem. Call the office if this occurs.    In case of emergency phone:Dr Lea [phone]

## 2018-07-16 NOTE — LETTER
7/16/2018         RE: Ramon Mora  [address] [facility] [address]        Dear Colleague,    Thank you for referring your patient, Ramon Mora, to the [facility]. Please see a copy of my visit note below.    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  Today he notes tender growths on left hand.   .  Patient states this has been present for a whilew.  Patient reports the following symptoms:  tender.  Patient reports the following previous treatments cryo.  Patient reports the following modifying factors none.  Associated symptoms: none.  Patient has no other skin complaints today.  Remainder of the HPI, Meds, PMH, Allergies, FH, and SH was reviewed in chart.      Past Medical History:   Diagnosis Date     Coronary artery disease      Diabetes mellitus (H)      Malignant neoplasm (H)     basal cell carcinoma     Squamous cell carcinoma        Past Surgical History:   Procedure Laterality Date     APPENDECTOMY       CARDIAC SURGERY      CABG     ENT SURGERY      tonsillectomy     EYE SURGERY      cataracts     ORTHOPEDIC SURGERY  1994    knee rodding        Family History   Problem Relation Age of Onset     Cancer - colorectal Brother 64       Social History     Social History     Marital status:      Spouse name: N/A     Number of children: N/A     Years of education: N/A     Occupational History     Not on file.     Social History Main Topics     Smoking status: Former Smoker     Smokeless tobacco: Never Used     Alcohol use Yes      Comment: rare     Drug use: No     Sexual activity: Not on file     Other Topics Concern     Not on file     Social History Narrative       Outpatient Encounter Prescriptions as of 7/16/2018   Medication Sig Dispense Refill     aspirin 81 MG tablet Take  by mouth daily.       Atorvastatin Calcium (LIPITOR PO) Take 40 mg by mouth daily        CLOPIDOGREL BISULFATE PO Take 75 mg by mouth daily       FERROUS GLUCONATE PO Take 324 mg by mouth daily (with breakfast)       HYDRALAZINE HCL PO Take 10 mg by mouth every 8 hours       isosorbide mononitrate (IMDUR) 60 MG 24 hr tablet Take 30 mg by mouth daily        METOPROLOL SUCCINATE ER PO Take 25 mg by mouth daily        Multiple Vitamins-Minerals (CENTRUM SILVER) per tablet Take 1 tablet by mouth daily       nitroglycerin (NITROSTAT) 0.4 MG SL tablet Place  under the tongue every 5 minutes as needed.       pantoprazole sodium (PROTONIX) 40 MG tablet Take 1 packet by mouth daily.       polyethylene glycol (MIRALAX/GLYCOLAX) powder Take 1 capful by mouth daily       SPIRONOLACTONE PO Take 25 mg by mouth daily       TORSEMIDE PO Take 20 mg by mouth daily       triamcinolone (KENALOG) 0.1 % cream Apply topically 2 times daily       No facility-administered encounter medications on file as of 7/16/2018.              Review Of Systems  Skin: As above  Eyes: negative  Ears/Nose/Throat: negative  Respiratory: No shortness of breath, dyspnea on exertion, cough, or hemoptysis  Cardiovascular: negative  Gastrointestinal: negative  Genitourinary: negative  Musculoskeletal: negative  Neurologic: negative  Psychiatric: negative  Hematologic/Lymphatic/Immunologic: negative  Endocrine: negative      O:   NAD, WDWN, Alert & Oriented, Mood & Affect wnl, Vitals stable   Here today alone   /53  Pulse 64  SpO2 95%   General appearance normal   Vitals stable   Alert, oriented and in no acute distress      Following lymph nodes palpated: Occipital, Cervical, Supraclavicular , no lad   R temple 1cm red scaly papule    L 1st MCP 9mm ill-deifned crusted scaly papule    L 2nd PIP 1.3cm ill-deifned red scaly papule   Gritty p[apules on face      Eyes: Conjunctivae/lids:Normal     ENT: Lips, buccal mucosa, tongue: normal    MSK:Normal    Cardiovascular: peripheral edema none    Pulm: Breathing Normal    Lymph Nodes: No Head and  Neck Lymphadenopathy     Neuro/Psych: Orientation:Normal; Mood/Affect:Normal      MICRO:   L 1st MCPO:There is hyperkeratosis & parakeratosis of the epidermis, with full thickness epidermal involvement by atypical keratinocytes with rare pale vacuolated cells.  Unremarkable dermis.    L 2nd PIP:There is hyperkeratosis & parakeratosis of the epidermis, with full thickness epidermal involvement by atypical keratinocytes with rare pale vacuolated cells.  Unremarkable dermis.    A/P:  1. Actinic keratosis on face  Efudex thi swinter  2. Squamous cell carcinoma temple  3. R/o squamous cell carcinoma fingers  TANGENTIAL BIOPSY IN HOUSE:  After consent, anesthesia with LEC and prep, tangential excision performed and dx above confirmed with frozen section histology.  No complications and routine wound care.  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  I explained that treatment of these lesions would be purely cosmetic and not medically neccessary.  I discussed with patient different removal options including excision, cautery and /or laser.      Nature and genetics of benign skin lesions dicussed with patient.  Signs and Symptoms of skin cancer discussed with patient.  Patient encouraged to perform monthly skin exams.  UV precautions reviewed with patient.  Patient to follow up with Primary Care provider regarding elevated blood pressure.  Skin care regimen reviewed with patient: Eliminate harsh soaps, i.e. Dial, zest, irsih spring; Mild soaps such as Cetaphil or Dove sensitive skin, avoid hot or cold showers, aggressive use of emollients including vanicream, cetaphil or cerave discussed with patient.    Risks of non-melanoma skin cancer discussed with patient   Return to clinic 6 months    PROCEDURE NOTE  R temple squamous cell  carcinoma   MOHS:   Location    After PGACAC discussed with patient, decision for Mohs surgery was made. Indication for Mohs was Location. Patient confirmed the site with Dr. Lea.  After anesthesia with LEC, the tumor was excised using standard Mohs technique in 1 stages(s).  CLEAR MARGINS OBTAINED and Final defect size was 1.4 cm.       REPAIR COMPLEX: Because of the tightness of the surrounding skin and Because of the size and full thickness nature of the defect, a complex closure was planned. After LEC anesthesia and prep, Burow's triangles were excised in the relaxed skin tension lines. The wound edges were widely undermined by dissection in the subcutaneous plane until adequate tissue mobility was obtained. Hemostasis was obtained. The wound edges were closed in a layered fashion using Vicryl and Fast Absorbing Plain Gut sutures. Postoperative length was 4 cm.   EBL minimal; complications none; wound care routine.  The patient was discharged in good condition and will return in one week for wound evaluation.    L 1st mcp squamous cell carcinoma in situ  MOHS:   Recurrent and Ill-defined margins    After PGACAC discussed with patient, decision for Mohs surgery was made. Indication for Mohs was Recurrent and Ill-defined margins. Patient confirmed the site with Dr. Lea.  After anesthesia with LEC, the tumor was excised using standard Mohs technique in 1 stages(s).  CLEAR MARGINS OBTAINED and Final defect size was 1.5 cm.       REPAIR SECOND INTENT: We discussed the options for wound management in full with the patient including risks/benefits/possible outcomes. Decision made to allow the wound to heal by second intention. EBL minimal; complications none; wound care routine.  The patient was discharged in good condition and will return in one month or prn for wound evaluation.    L 2nd PIP squamous cell carcinoma in situ  MOHS:   Recurrent and Ill-defined margins    After PGACAC discussed with patient,  decision for Mohs surgery was made. Indication for Mohs was Recurrent and Ill-defined margins. Patient confirmed the site with Dr. Lea.  After anesthesia with LEC, the tumor was excised using standard Mohs technique in 1 stages(s).  CLEAR MARGINS OBTAINED and Final defect size was 1.7 cm.       REPAIR SECOND INTENT: We discussed the options for wound management in full with the patient including risks/benefits/possible outcomes. Decision made to allow the wound to heal by second intention. EBL minimal; complications none; wound care routine.  The patient was discharged in good condition and will return in one month or prn for wound evaluation.        Again, thank you for allowing me to participate in the care of your patient.        Sincerely,        Thad Lea MD

## 2018-07-16 NOTE — MR AVS SNAPSHOT
After Visit Summary   2018    Ramon Mora    MRN: [id number]           Patient Information     Date Of Birth          1933        Visit Information        Provider Department      2018 7:45 AM Thad Lea MD [facility]        Care Instructions      Sutured Wound Care     [facility]: [phone]    [facility]: [phone]          ? No strenuous activity for 48 hours. Resume moderate activity in 48 hours. No heavy exercising until you are seen for follow up in one week.     ? Take Tylenol as needed for discomfort.                         ? Do not drink alcoholic beverages for 48 hours.     ? Keep the pressure bandage in place for 24 hours. If the bandage becomes blood tinged or loose, reinforce it with gauze and tape.        (Refer to the reverse side of this page for management of bleeding).    ? Remove pressure bandage in 24 hours     ? Leave the flat bandage in place until your follow up appointment.    ? Keep the bandage dry. Wash around it carefully.    ? If the tape becomes soiled or starts to come off, reinforce it with additional paper tape.    ? Do not smoke for 3 weeks; smoking is detrimental to wound healing.    ? It is normal to have swelling and bruising around the surgical site. The bruising will fade in approximately 10-14 days. Elevate the area to reduce swelling.    ? Numbness, itchiness and sensitivity to temperature changes can occur after surgery and may take up to 18 months to normalize.      POSSIBLE COMPLICATIONS    BLEEDIN. Leave the bandage in place.  2. Use tightly rolled up gauze or a cloth to apply direct pressure over the bandage for 20   minutes.  3. Reapply pressure for an additional 20 minutes if necessary  4. Call the office or go to the nearest emergency room if pressure fails to stop the bleeding.  5. Use additional gauze and tape to maintain pressure once the bleeding has  stopped.        PAIN:    1. Post operative pain should slowly get better, never worse.  2. A severe increase in pain may indicate a problem. Call the office if this occurs.    In case of emergency phone:Dr Lea [phone]              Follow-ups after your visit        Your next 10 appointments already scheduled     Jul 23, 2018  8:00 AM CDT   [facility] with Thad Lea MD   [facility]    [address] [address] [phone]              Who to contact     If you have questions or need follow up information about today's clinic visit or your schedule please contact [facility] directly at [phone].  Normal or non-critical lab and imaging results will be communicated to you by MyChart, letter or phone within 4 business days after the clinic has received the results. If you do not hear from us within 7 days, please contact the clinic through MyChart or phone. If you have a critical or abnormal lab result, we will notify you by phone as soon as possible.  Submit refill requests through [employer] or call your pharmacy and they will forward the refill request to us. Please allow 3 business days for your refill to be completed.          Additional Information About Your Visit        Care EveryWhere ID     This is your Care EveryWhere ID. This could be used by other organizations to access your [place] medical records  [id number]        Your Vitals Were     Pulse Pulse Oximetry                64 95%           Blood Pressure from Last 3 Encounters:   07/16/18 130/53   06/06/18 149/72   02/16/17 130/63    Weight from Last 3 Encounters:   02/16/17 81.2 kg (179 lb)   02/07/17 81.2 kg (179 lb)   02/05/17 80.7 kg (178 lb)              Today, you had the following     No orders found for display       Primary Care Provider Office Phone # Fax #    Rex Luna [phone]       [address]  [facility] [address]        Equal Access to Services     MATEUSZSARAHI COBY : Hadii aad ku hadlandenmariam Wernerali, wacinthiada luqadaha, qaybta kaalmaangelika savage. So [facility] [phone].    ATENCIÓN: Si habla español, tiene a carver disposición servicios gratuitos de asistencia lingüística. [facility] [phone].    We comply with applicable federal civil rights laws and [place] laws. We do not discriminate on the basis of race, color, national origin, age, disability, sex, sexual orientation, or gender identity.            Thank you!     Thank you for choosing [facility]  for your care. Our goal is always to provide you with excellent care. Hearing back from our patients is one way we can continue to improve our services. Please take a few minutes to complete the written survey that you may receive in the mail after your visit with us. Thank you!             Your Updated Medication List - Protect others around you: Learn how to safely use, store and throw away your medicines at www.disposemymeds.org.          This list is accurate as of 7/16/18 10:22 AM.  Always use your most recent med list.                   Brand Name Dispense Instructions for use Diagnosis    aspirin 81 MG tablet      Take  by mouth daily.        CENTRUM SILVER per tablet      Take 1 tablet by mouth daily        CLOPIDOGREL BISULFATE PO      Take 75 mg by mouth daily        FERROUS GLUCONATE PO      Take 324 mg by mouth daily (with breakfast)        HYDRALAZINE HCL PO      Take 10 mg by mouth every 8 hours        isosorbide mononitrate 60 MG 24 hr tablet    IMDUR     Take 30 mg by mouth daily        LIPITOR PO      Take 40 mg by mouth daily        METOPROLOL SUCCINATE ER PO      Take 25 mg by mouth daily        nitroGLYcerin 0.4 MG sublingual tablet    NITROSTAT     Place  under the tongue every 5 minutes as needed.        polyethylene glycol powder    MIRALAX/GLYCOLAX     Take 1 capful by mouth  daily        PROTONIX 40 MG packet   Generic drug:  pantoprazole sodium      Take 1 packet by mouth daily.        SPIRONOLACTONE PO      Take 25 mg by mouth daily        TORSEMIDE PO      Take 20 mg by mouth daily        triamcinolone 0.1 % cream    KENALOG     Apply topically 2 times daily

## 2018-07-23 NOTE — MR AVS SNAPSHOT
After Visit Summary   7/23/2018    Ramon Mora    MRN: [id number]           Patient Information     Date Of Birth          7/17/1933        Visit Information        Provider Department      7/23/2018 3:15 PM Nurse, Jeanette Javier [facility]        Today's Diagnoses     Encounter for removal of sutures    -  1       Follow-ups after your visit        Your next 10 appointments already scheduled     Jul 30, 2018  2:30 PM CDT   [facility] with Jeanette Javier [facility]   [facility]    [address] [address] [phone]              Who to contact     If you have questions or need follow up information about today's clinic visit or your schedule please contact [facility] directly at [phone].  Normal or non-critical lab and imaging results will be communicated to you by MyChart, letter or phone within 4 business days after the clinic has received the results. If you do not hear from us within 7 days, please contact the clinic through MyChart or phone. If you have a critical or abnormal lab result, we will notify you by phone as soon as possible.  Submit refill requests through [employer] or call your pharmacy and they will forward the refill request to us. Please allow 3 business days for your refill to be completed.          Additional Information About Your Visit        Care EveryWhere ID     This is your Care EveryWhere ID. This could be used by other organizations to access your [place] medical records  [id number]         Blood Pressure from Last 3 Encounters:   07/23/18 117/59   07/16/18 130/53   06/06/18 149/72    Weight from Last 3 Encounters:   02/16/17 81.2 kg (179 lb)   02/07/17 81.2 kg (179 lb)   02/05/17 80.7 kg (178 lb)              Today, you had the following     No orders found for display       Primary Care Provider Office Phone # Fax #    Rex Luna [phone]       [facility] [address] [address]        Equal Access to Services     MATEUSZSARAHI COBY : Hadii aad ku hadlandenmariam Dixon, krissy ruiz, qasunny alvesmabuddy kelly, angelika forrester. So [facility] [phone].    ATENCIÓN: Si habla español, tiene a carver disposición servicios gratuitos de asistencia lingüística. [facility] [phone].    We comply with applicable federal civil rights laws and [place] laws. We do not discriminate on the basis of race, color, national origin, age, disability, sex, sexual orientation, or gender identity.            Thank you!     Thank you for choosing [facility]  for your care. Our goal is always to provide you with excellent care. Hearing back from our patients is one way we can continue to improve our services. Please take a few minutes to complete the written survey that you may receive in the mail after your visit with us. Thank you!             Your Updated Medication List - Protect others around you: Learn how to safely use, store and throw away your medicines at www.disposemymeds.org.          This list is accurate as of 7/23/18  3:25 PM.  Always use your most recent med list.                   Brand Name Dispense Instructions for use Diagnosis    aspirin 81 MG tablet      Take  by mouth daily.        CENTRUM SILVER per tablet      Take 1 tablet by mouth daily        CLOPIDOGREL BISULFATE PO      Take 75 mg by mouth daily        FERROUS GLUCONATE PO      Take 324 mg by mouth daily (with breakfast)        HYDRALAZINE HCL PO      Take 10 mg by mouth every 8 hours        isosorbide mononitrate 60 MG 24 hr tablet    IMDUR     Take 30 mg by mouth daily        LIPITOR PO      Take 40 mg by mouth daily        METOPROLOL SUCCINATE ER PO      Take 25 mg by mouth daily        nitroGLYcerin 0.4 MG sublingual tablet    NITROSTAT     Place  under the tongue every 5 minutes as needed.        polyethylene glycol powder    MIRALAX/GLYCOLAX     Take 1  capful by mouth daily        PROTONIX 40 MG packet   Generic drug:  pantoprazole sodium      Take 1 packet by mouth daily.        SPIRONOLACTONE PO      Take 25 mg by mouth daily        TORSEMIDE PO      Take 20 mg by mouth daily        triamcinolone 0.1 % cream    KENALOG     Apply topically 2 times daily

## 2018-07-23 NOTE — PATIENT INSTRUCTIONS
WOUND CARE INSTRUCTIONS   RIGHT SIDEBURN  for  ONE WEEK AFTER SURGERY          1) Leave flat bandage on your skin for one week after today s bandage change.  2) In one week when you remove the bandage, you may resume your regular skin care routine, including washing with mild soap and water, applying moisturizer, make-up and sunscreen.    3) If there are any open or bleeding areas at the incision/graft site you should begin to cover the area with a bandage daily as follows:    1) Clean and dry the area with plain tap water using a Q-tip or sterile gauze pad.  2) Apply Polysporin or Bacitracin ointment to the open area.  3) Cover the wound with a band-aid or a sterile non-stick gauze pad and micropore paper tape.         SIGNS OF INFECTION  - If you notice any of these signs of infection, call your doctor right away: expanding redness around the wound.  - Yellow or greenish-colored pus or cloudy wound drainage.    - Red streaking spreading from the wound.  - Increased swelling, tenderness, or pain around the wound.   - Fever.    Please remember that yellow and clear drainage from a wound can be normal and related to normal wound healing.  Isolated drainage from a wound without a combination of the above features does not indicate infection.       *Once the bandages are removed, the scar will be red and firm (especially in the lip/chin area). This is normal and will fade in time. It might take 6-12 months for this to happen.     *Massaging the area will help the scar soften and fade quicker. Begin to massage the area one month after the bandages have been removed. To massage apply pressure directly and firmly over the scar with the fingertips and move in a circular motion. Massage the area for a few minutes several times a day. Continue to massage the site for several months.    *Approximately 6-8 weeks after surgery it is not uncommon to see the formation of  tender pimple-like  bump along the scar. This is normal.  As the scar continues to mature and the stitches underneath the skin begin to dissolve, this might occur. Do not pick or squeeze, this will resolve on it s own. Should one break open producing a small amount of drainage, apply Polysporin or Bacitracin ointment a few times a day until the wound is completely healed.    *Numbness in the surgical area is expected. It might take 12-18 months for the feeling to return to normal. During this time sensations of itchiness, tingling and occasional sharp pains might be noted. These feelings are normal and will subside once the nerves have completely healed.         IN CASE OF EMERGENCY: Dr Lea [phone]     If you were seen in [place] call: [phone]    If you were seen in [place] call: [phone]        Sutured Wound Care     [facility]: [phone]    [facility]: [phone]    Cheek      ? No strenuous activity for 48 hours. Resume moderate activity in 48 hours. No heavy exercising until you are seen for follow up in one week.     ? Take Tylenol as needed for discomfort.                         ? Do not drink alcoholic beverages for 48 hours.     ? Keep the pressure bandage in place for 24 hours. If the bandage becomes blood tinged or loose, reinforce it with gauze and tape.        (Refer to the reverse side of this page for management of bleeding).    ? Remove pressure bandage in 24 hours     ? Leave the flat bandage in place until your follow up appointment.    ? Keep the bandage dry. Wash around it carefully.    ? If the tape becomes soiled or starts to come off, reinforce it with additional paper tape.    ? Do not smoke for 3 weeks; smoking is detrimental to wound healing.    ? It is normal to have swelling and bruising around the surgical site. The bruising will fade in approximately 10-14 days. Elevate the area to reduce swelling.    ? Numbness, itchiness and sensitivity to temperature changes can occur after surgery and may take  up to 18 months to normalize.      POSSIBLE COMPLICATIONS    BLEEDIN. Leave the bandage in place.  2. Use tightly rolled up gauze or a cloth to apply direct pressure over the bandage for 20   minutes.  3. Reapply pressure for an additional 20 minutes if necessary  4. Call the office or go to the nearest emergency room if pressure fails to stop the bleeding.  5. Use additional gauze and tape to maintain pressure once the bleeding has stopped.        PAIN:    1. Post operative pain should slowly get better, never worse.  2. A severe increase in pain may indicate a problem. Call the office if this occurs.    In case of emergency phone:Dr Lea [phone]

## 2018-07-23 NOTE — NURSING NOTE
"Initial /59  Pulse 59  SpO2 96% Estimated body mass index is 25.68 kg/(m^2) as calculated from the following:    Height as of 2/5/17: 1.778 m (5' 10\").    Weight as of 2/16/17: 81.2 kg (179 lb). .      "

## 2018-07-23 NOTE — PROGRESS NOTES
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 Patient reports the following modifying factors none.  Associated symptoms: none.  Patient has no other skin complaints today.  Remainder of the HPI, Meds, PMH, Allergies, FH, and SH was reviewed in chart.    Pertinent Hx:   Non-melanoma skin cancer   Past Medical History:   Diagnosis Date     Coronary artery disease      Diabetes mellitus (H)      Malignant neoplasm (H)     basal cell carcinoma     Squamous cell carcinoma        Past Surgical History:   Procedure Laterality Date     APPENDECTOMY       CARDIAC SURGERY      CABG     ENT SURGERY      tonsillectomy     EYE SURGERY      cataracts     ORTHOPEDIC SURGERY  1994    knee rodding        Family History   Problem Relation Age of Onset     Cancer - colorectal Brother 64       Social History     Social History     Marital status:      Spouse name: N/A     Number of children: N/A     Years of education: N/A     Occupational History     Not on file.     Social History Main Topics     Smoking status: Former Smoker     Smokeless tobacco: Never Used     Alcohol use Yes      Comment: rare     Drug use: No     Sexual activity: Not on file     Other Topics Concern     Not on file     Social History Narrative       Outpatient Encounter Prescriptions as of 7/23/2018   Medication Sig Dispense Refill     aspirin 81 MG tablet Take  by mouth daily.       Atorvastatin Calcium (LIPITOR PO) Take 40 mg by mouth daily       CLOPIDOGREL BISULFATE PO Take 75 mg by mouth daily       FERROUS GLUCONATE PO Take 324 mg by mouth daily (with breakfast)       HYDRALAZINE HCL PO Take 10 mg by mouth every 8 hours       isosorbide mononitrate (IMDUR) 60 MG 24 hr tablet Take 30 mg by mouth daily        METOPROLOL SUCCINATE ER PO Take 25 mg by mouth daily        Multiple Vitamins-Minerals (CENTRUM SILVER) per tablet Take 1 tablet by mouth daily        nitroglycerin (NITROSTAT) 0.4 MG SL tablet Place  under the tongue every 5 minutes as needed.       pantoprazole sodium (PROTONIX) 40 MG tablet Take 1 packet by mouth daily.       polyethylene glycol (MIRALAX/GLYCOLAX) powder Take 1 capful by mouth daily       SPIRONOLACTONE PO Take 25 mg by mouth daily       TORSEMIDE PO Take 20 mg by mouth daily       triamcinolone (KENALOG) 0.1 % cream Apply topically 2 times daily       No facility-administered encounter medications on file as of 7/23/2018.              Review Of Systems  Skin: As above  Eyes: negative  Ears/Nose/Throat: negative  Respiratory: No shortness of breath, dyspnea on exertion, cough, or hemoptysis  Cardiovascular: negative  Gastrointestinal: negative  Genitourinary: negative  Musculoskeletal: negative  Neurologic: negative  Psychiatric: negative  Hematologic/Lymphatic/Immunologic: negative  Endocrine: negative      O:   NAD, WDWN, Alert & Oriented, Mood & Affect wnl, Vitals stable   Here today alone   /59  Pulse 59  SpO2 96%   General appearance normal   Vitals stable   Alert, oriented and in no acute distress      Following lymph nodes palpated: Occipital, Cervical, Supraclavicular no lad   L cheek 2.1cm red plaque       Eyes: Conjunctivae/lids:Normal     ENT: Lips, buccal mucosa, tongue: normal    MSK:Normal    Cardiovascular: peripheral edema none    Pulm: Breathing Normal    Lymph Nodes: No Head and Neck Lymphadenopathy     Neuro/Psych: Orientation:Normal; Mood/Affect:Normal      A/P:  1. L cheek squamous cell carcinoma in situ  MOHS:   Size and Location    After PGACAC discussed with patient, decision for Mohs surgery was made. Indication for Mohs was Size and Location. Patient confirmed the site with Dr. Lea.  After anesthesia with LEC, the tumor was excised using standard Mohs technique in 1 stages(s).  CLEAR MARGINS OBTAINED and Final defect size was 2.7 cm.       REPAIR COMPLEX: Because of the tightness of the surrounding skin  and Because of the size and full thickness nature of the defect, a complex closure was planned. After LEC anesthesia and prep, Burow's triangles were excised in the relaxed skin tension lines. The wound edges were widely undermined by dissection in the subcutaneous plane until adequate tissue mobility was obtained. Hemostasis was obtained. The wound edges were closed in a layered fashion using Vicryl and Fast Absorbing Plain Gut sutures. Postoperative length was 5.8 cm.   EBL minimal; complications none; wound care routine.  The patient was discharged in good condition and will return in one week for wound evaluation.    BENIGN LESIONS DISCUSSED WITH PATIENT:  I discussed the specifics of tumor, prognosis, and genetics of benign lesions.  I explained that treatment of these lesions would be purely cosmetic and not medically neccessary.  I discussed with patient different removal options including excision, cautery and /or laser.      Nature and genetics of benign skin lesions dicussed with patient.  Signs and Symptoms of skin cancer discussed with patient.  ABCDEs of melanoma reviewed with patient.  Patient encouraged to perform monthly skin exams.  UV precautions reviewed with patient.  Patient to follow up with Primary Care provider regarding elevated blood pressure.  Skin care regimen reviewed with patient: Eliminate harsh soaps, i.e. Dial, zest, irsih spring; Mild soaps such as Cetaphil or Dove sensitive skin, avoid hot or cold showers, aggressive use of emollients including vanicream, cetaphil or cerave discussed with patient.    Risks of non-melanoma skin cancer discussed with patient   Return to clinic 6 months  Patient to follow up with Primary Care provider regarding elevated blood pressure.

## 2018-07-23 NOTE — MR AVS SNAPSHOT
After Visit Summary   7/23/2018    Ramon Mora    MRN: [id number]           Patient Information     Date Of Birth          7/17/1933        Visit Information        Provider Department      7/23/2018 8:00 AM Thad Lea MD [facility]        Care Instructions    WOUND CARE INSTRUCTIONS   RIGHT SIDEBURN  for  ONE WEEK AFTER SURGERY          1) Leave flat bandage on your skin for one week after today s bandage change.  2) In one week when you remove the bandage, you may resume your regular skin care routine, including washing with mild soap and water, applying moisturizer, make-up and sunscreen.    3) If there are any open or bleeding areas at the incision/graft site you should begin to cover the area with a bandage daily as follows:    1) Clean and dry the area with plain tap water using a Q-tip or sterile gauze pad.  2) Apply Polysporin or Bacitracin ointment to the open area.  3) Cover the wound with a band-aid or a sterile non-stick gauze pad and micropore paper tape.         SIGNS OF INFECTION  - If you notice any of these signs of infection, call your doctor right away: expanding redness around the wound.  - Yellow or greenish-colored pus or cloudy wound drainage.    - Red streaking spreading from the wound.  - Increased swelling, tenderness, or pain around the wound.   - Fever.    Please remember that yellow and clear drainage from a wound can be normal and related to normal wound healing.  Isolated drainage from a wound without a combination of the above features does not indicate infection.       *Once the bandages are removed, the scar will be red and firm (especially in the lip/chin area). This is normal and will fade in time. It might take 6-12 months for this to happen.     *Massaging the area will help the scar soften and fade quicker. Begin to massage the area one month after the bandages have been removed. To massage apply pressure directly and firmly over the  scar with the fingertips and move in a circular motion. Massage the area for a few minutes several times a day. Continue to massage the site for several months.    *Approximately 6-8 weeks after surgery it is not uncommon to see the formation of  tender pimple-like  bump along the scar. This is normal. As the scar continues to mature and the stitches underneath the skin begin to dissolve, this might occur. Do not pick or squeeze, this will resolve on it s own. Should one break open producing a small amount of drainage, apply Polysporin or Bacitracin ointment a few times a day until the wound is completely healed.    *Numbness in the surgical area is expected. It might take 12-18 months for the feeling to return to normal. During this time sensations of itchiness, tingling and occasional sharp pains might be noted. These feelings are normal and will subside once the nerves have completely healed.         IN CASE OF EMERGENCY: Dr Lea [phone]     If you were seen in [place] call: [phone]    If you were seen in [place] call: [phone]        Sutured Wound Care     [facility]: [phone]    [facility]: [phone]    Cheek      ? No strenuous activity for 48 hours. Resume moderate activity in 48 hours. No heavy exercising until you are seen for follow up in one week.     ? Take Tylenol as needed for discomfort.                         ? Do not drink alcoholic beverages for 48 hours.     ? Keep the pressure bandage in place for 24 hours. If the bandage becomes blood tinged or loose, reinforce it with gauze and tape.        (Refer to the reverse side of this page for management of bleeding).    ? Remove pressure bandage in 24 hours     ? Leave the flat bandage in place until your follow up appointment.    ? Keep the bandage dry. Wash around it carefully.    ? If the tape becomes soiled or starts to come off, reinforce it with additional paper tape.    ? Do not smoke for 3 weeks;  smoking is detrimental to wound healing.    ? It is normal to have swelling and bruising around the surgical site. The bruising will fade in approximately 10-14 days. Elevate the area to reduce swelling.    ? Numbness, itchiness and sensitivity to temperature changes can occur after surgery and may take up to 18 months to normalize.      POSSIBLE COMPLICATIONS    BLEEDIN. Leave the bandage in place.  2. Use tightly rolled up gauze or a cloth to apply direct pressure over the bandage for 20   minutes.  3. Reapply pressure for an additional 20 minutes if necessary  4. Call the office or go to the nearest emergency room if pressure fails to stop the bleeding.  5. Use additional gauze and tape to maintain pressure once the bleeding has stopped.        PAIN:    1. Post operative pain should slowly get better, never worse.  2. A severe increase in pain may indicate a problem. Call the office if this occurs.    In case of emergency phone:Dr Lea [phone]            Follow-ups after your visit        Your next 10 appointments already scheduled     2018  3:15 PM CDT   Return Visit with [facility]   [facility]    [address] [place] [address] [phone]              Who to contact     If you have questions or need follow up information about today's clinic visit or your schedule please contact [facility] directly at [phone].  Normal or non-critical lab and imaging results will be communicated to you by MyChart, letter or phone within 4 business days after the clinic has received the results. If you do not hear from us within 7 days, please contact the clinic through [employer] or phone. If you have a critical or abnormal lab result, we will notify you by phone as soon as possible.  Submit refill requests through [employer] or call your pharmacy and they will forward the refill request to us. Please allow 3 business days for  your refill to be completed.          Additional Information About Your Visit        Care EveryWhere ID     This is your Care EveryWhere ID. This could be used by other organizations to access your [facility] medical records  [id number]        Your Vitals Were     Pulse Pulse Oximetry                59 96%           Blood Pressure from Last 3 Encounters:   07/23/18 117/59   07/16/18 130/53   06/06/18 149/72    Weight from Last 3 Encounters:   02/16/17 81.2 kg (179 lb)   02/07/17 81.2 kg (179 lb)   02/05/17 80.7 kg (178 lb)              Today, you had the following     No orders found for display       Primary Care Provider Office Phone # Fax #    Rex GONZÁLES Luna [phone]       [place] [id number]        Equal Access to Services     JAYCEE TENORIO : Hadii aad ku hadasho Socelso, waaxda luqadaha, qaybta kaalmada adeegyada, angelika guevara . So [facility] [phone].    ATENCIÓN: Si habla español, tiene a carver disposición servicios gratuitos de asistencia lingüística. Mariajose al [phone].    We comply with applicable federal civil rights laws and [place] laws. We do not discriminate on the basis of race, color, national origin, age, disability, sex, sexual orientation, or gender identity.            Thank you!     Thank you for choosing [facility]  for your care. Our goal is always to provide you with excellent care. Hearing back from our patients is one way we can continue to improve our services. Please take a few minutes to complete the written survey that you may receive in the mail after your visit with us. Thank you!             Your Updated Medication List - Protect others around you: Learn how to safely use, store and throw away your medicines at www.disposemymeds.org.          This list is accurate as of 7/23/18 10:36 AM.  Always use your most recent med list.                   Brand Name Dispense Instructions for use  Diagnosis    aspirin 81 MG tablet      Take  by mouth daily.        CENTRUM SILVER per tablet      Take 1 tablet by mouth daily        CLOPIDOGREL BISULFATE PO      Take 75 mg by mouth daily        FERROUS GLUCONATE PO      Take 324 mg by mouth daily (with breakfast)        HYDRALAZINE HCL PO      Take 10 mg by mouth every 8 hours        isosorbide mononitrate 60 MG 24 hr tablet    IMDUR     Take 30 mg by mouth daily        LIPITOR PO      Take 40 mg by mouth daily        METOPROLOL SUCCINATE ER PO      Take 25 mg by mouth daily        nitroGLYcerin 0.4 MG sublingual tablet    NITROSTAT     Place  under the tongue every 5 minutes as needed.        polyethylene glycol powder    MIRALAX/GLYCOLAX     Take 1 capful by mouth daily        PROTONIX 40 MG packet   Generic drug:  pantoprazole sodium      Take 1 packet by mouth daily.        SPIRONOLACTONE PO      Take 25 mg by mouth daily        TORSEMIDE PO      Take 20 mg by mouth daily        triamcinolone 0.1 % cream    KENALOG     Apply topically 2 times daily

## 2018-07-23 NOTE — LETTER
7/23/2018         RE: Ramon Mora  [address] [address] [place] [address]        Dear Colleague,    Thank you for referring your patient, Ramon Mora, to the [facility]. Please see a copy of my visit note below.    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 Patient reports the following modifying factors none.  Associated symptoms: none.  Patient has no other skin complaints today.  Remainder of the HPI, Meds, PMH, Allergies, FH, and SH was reviewed in chart.    Pertinent Hx:   Non-melanoma skin cancer   Past Medical History:   Diagnosis Date     Coronary artery disease      Diabetes mellitus (H)      Malignant neoplasm (H)     basal cell carcinoma     Squamous cell carcinoma        Past Surgical History:   Procedure Laterality Date     APPENDECTOMY       CARDIAC SURGERY      CABG     ENT SURGERY      tonsillectomy     EYE SURGERY      cataracts     ORTHOPEDIC SURGERY  1994    knee rodding        Family History   Problem Relation Age of Onset     Cancer - colorectal Brother 64       Social History     Social History     Marital status:      Spouse name: N/A     Number of children: N/A     Years of education: N/A     Occupational History     Not on file.     Social History Main Topics     Smoking status: Former Smoker     Smokeless tobacco: Never Used     Alcohol use Yes      Comment: rare     Drug use: No     Sexual activity: Not on file     Other Topics Concern     Not on file     Social History Narrative       Outpatient Encounter Prescriptions as of 7/23/2018   Medication Sig Dispense Refill     aspirin 81 MG tablet Take  by mouth daily.       Atorvastatin Calcium (LIPITOR PO) Take 40 mg by mouth daily       CLOPIDOGREL BISULFATE PO Take 75 mg by mouth daily       FERROUS GLUCONATE PO Take 324 mg by mouth daily (with breakfast)       HYDRALAZINE HCL PO Take 10 mg by mouth  every 8 hours       isosorbide mononitrate (IMDUR) 60 MG 24 hr tablet Take 30 mg by mouth daily        METOPROLOL SUCCINATE ER PO Take 25 mg by mouth daily        Multiple Vitamins-Minerals (CENTRUM SILVER) per tablet Take 1 tablet by mouth daily       nitroglycerin (NITROSTAT) 0.4 MG SL tablet Place  under the tongue every 5 minutes as needed.       pantoprazole sodium (PROTONIX) 40 MG tablet Take 1 packet by mouth daily.       polyethylene glycol (MIRALAX/GLYCOLAX) powder Take 1 capful by mouth daily       SPIRONOLACTONE PO Take 25 mg by mouth daily       TORSEMIDE PO Take 20 mg by mouth daily       triamcinolone (KENALOG) 0.1 % cream Apply topically 2 times daily       No facility-administered encounter medications on file as of 7/23/2018.              Review Of Systems  Skin: As above  Eyes: negative  Ears/Nose/Throat: negative  Respiratory: No shortness of breath, dyspnea on exertion, cough, or hemoptysis  Cardiovascular: negative  Gastrointestinal: negative  Genitourinary: negative  Musculoskeletal: negative  Neurologic: negative  Psychiatric: negative  Hematologic/Lymphatic/Immunologic: negative  Endocrine: negative      O:   NAD, WDWN, Alert & Oriented, Mood & Affect wnl, Vitals stable   Here today alone   /59  Pulse 59  SpO2 96%   General appearance normal   Vitals stable   Alert, oriented and in no acute distress      Following lymph nodes palpated: Occipital, Cervical, Supraclavicular no lad   L cheek 2.1cm red plaque       Eyes: Conjunctivae/lids:Normal     ENT: Lips, buccal mucosa, tongue: normal    MSK:Normal    Cardiovascular: peripheral edema none    Pulm: Breathing Normal    Lymph Nodes: No Head and Neck Lymphadenopathy     Neuro/Psych: Orientation:Normal; Mood/Affect:Normal      A/P:  1. L cheek squamous cell carcinoma in situ  MOHS:   Size and Location    After PGACAC discussed with patient, decision for Mohs surgery was made. Indication for Mohs was Size and Location. Patient confirmed  the site with Dr. Lea.  After anesthesia with LEC, the tumor was excised using standard Mohs technique in 1 stages(s).  CLEAR MARGINS OBTAINED and Final defect size was 2.7 cm.       REPAIR COMPLEX: Because of the tightness of the surrounding skin and Because of the size and full thickness nature of the defect, a complex closure was planned. After LEC anesthesia and prep, Burow's triangles were excised in the relaxed skin tension lines. The wound edges were widely undermined by dissection in the subcutaneous plane until adequate tissue mobility was obtained. Hemostasis was obtained. The wound edges were closed in a layered fashion using Vicryl and Fast Absorbing Plain Gut sutures. Postoperative length was 5.8 cm.   EBL minimal; complications none; wound care routine.  The patient was discharged in good condition and will return in one week for wound evaluation.    BENIGN LESIONS DISCUSSED WITH PATIENT:  I discussed the specifics of tumor, prognosis, and genetics of benign lesions.  I explained that treatment of these lesions would be purely cosmetic and not medically neccessary.  I discussed with patient different removal options including excision, cautery and /or laser.      Nature and genetics of benign skin lesions dicussed with patient.  Signs and Symptoms of skin cancer discussed with patient.  ABCDEs of melanoma reviewed with patient.  Patient encouraged to perform monthly skin exams.  UV precautions reviewed with patient.  Patient to follow up with Primary Care provider regarding elevated blood pressure.  Skin care regimen reviewed with patient: Eliminate harsh soaps, i.e. Dial, zest, irsih spring; Mild soaps such as Cetaphil or Dove sensitive skin, avoid hot or cold showers, aggressive use of emollients including vanicream, cetaphil or cerave discussed with patient.    Risks of non-melanoma skin cancer discussed with patient   Return to clinic 6 months  Patient to follow up with Primary Care provider  regarding elevated blood pressure.          Again, thank you for allowing me to participate in the care of your patient.        Sincerely,        Thad Lea MD

## 2018-07-23 NOTE — PROGRESS NOTES
Pt returned to clinic for post surgery 1 week follow up bandage change. Pt has no complaints, denies pain. Bandage removed from temple area cleansed with normal saline. Site is healing and wound edges approximating well. Reapplied new steri strips and paper tape.    Advised to watch for signs/sx of infection; spreading redness, drainage, odor, fever. Call or report promptly to clinic. Pt given written instructions and informed to rtc as needed. Patient verbalized understanding.

## 2018-07-30 NOTE — PATIENT INSTRUCTIONS
WOUND CARE INSTRUCTIONS  CHEEK  for  ONE WEEK AFTER SURGERY          1) Leave flat bandage on your skin for one week after today s bandage change.  2) In one week when you remove the bandage, you may resume your regular skin care routine, including washing with mild soap and water, applying moisturizer, make-up and sunscreen.    3) If there are any open or bleeding areas at the incision/graft site you should begin to cover the area with a bandage daily as follows:    1) Clean and dry the area with plain tap water using a Q-tip or sterile gauze pad.  2) Apply Polysporin or Bacitracin ointment to the open area.  3) Cover the wound with a band-aid or a sterile non-stick gauze pad and micropore paper tape.         SIGNS OF INFECTION  - If you notice any of these signs of infection, call your doctor right away: expanding redness around the wound.  - Yellow or greenish-colored pus or cloudy wound drainage.    - Red streaking spreading from the wound.  - Increased swelling, tenderness, or pain around the wound.   - Fever.    Please remember that yellow and clear drainage from a wound can be normal and related to normal wound healing.  Isolated drainage from a wound without a combination of the above features does not indicate infection.       *Once the bandages are removed, the scar will be red and firm (especially in the lip/chin area). This is normal and will fade in time. It might take 6-12 months for this to happen.     *Massaging the area will help the scar soften and fade quicker. Begin to massage the area one month after the bandages have been removed. To massage apply pressure directly and firmly over the scar with the fingertips and move in a circular motion. Massage the area for a few minutes several times a day. Continue to massage the site for several months.    *Approximately 6-8 weeks after surgery it is not uncommon to see the formation of  tender pimple-like  bump along the scar. This is normal. As the scar  continues to mature and the stitches underneath the skin begin to dissolve, this might occur. Do not pick or squeeze, this will resolve on it s own. Should one break open producing a small amount of drainage, apply Polysporin or Bacitracin ointment a few times a day until the wound is completely healed.    *Numbness in the surgical area is expected. It might take 12-18 months for the feeling to return to normal. During this time sensations of itchiness, tingling and occasional sharp pains might be noted. These feelings are normal and will subside once the nerves have completely healed.         IN CASE OF EMERGENCY: Dr Lea [phone]     If you were seen in [place] call: [phone]    If you were seen in [place] call: [phone]

## 2018-07-30 NOTE — PROGRESS NOTES
Pt returned to clinic for post surgery 1 week follow up bandage change. Pt has no complaints, denies pain. Bandage removed from cheek area cleansed with normal saline. Site is healing and wound edges approximating well. Reapplied new steri strips and paper tape.    Advised to watch for signs/sx of infection; spreading redness, drainage, odor, fever. Call or report promptly to clinic. Pt given written instructions and informed to rtc as needed. Patient verbalized understanding.

## 2018-07-30 NOTE — MR AVS SNAPSHOT
After Visit Summary   7/30/2018    Ramon Mora    MRN: [id number]           Patient Information     Date Of Birth          7/17/1933        Visit Information        Provider Department      7/30/2018 2:30 PM Jeanette Gonzalez [facility]        Care Instructions    WOUND CARE INSTRUCTIONS  CHEEK  for  ONE WEEK AFTER SURGERY          1) Leave flat bandage on your skin for one week after today s bandage change.  2) In one week when you remove the bandage, you may resume your regular skin care routine, including washing with mild soap and water, applying moisturizer, make-up and sunscreen.    3) If there are any open or bleeding areas at the incision/graft site you should begin to cover the area with a bandage daily as follows:    1) Clean and dry the area with plain tap water using a Q-tip or sterile gauze pad.  2) Apply Polysporin or Bacitracin ointment to the open area.  3) Cover the wound with a band-aid or a sterile non-stick gauze pad and micropore paper tape.         SIGNS OF INFECTION  - If you notice any of these signs of infection, call your doctor right away: expanding redness around the wound.  - Yellow or greenish-colored pus or cloudy wound drainage.    - Red streaking spreading from the wound.  - Increased swelling, tenderness, or pain around the wound.   - Fever.    Please remember that yellow and clear drainage from a wound can be normal and related to normal wound healing.  Isolated drainage from a wound without a combination of the above features does not indicate infection.       *Once the bandages are removed, the scar will be red and firm (especially in the lip/chin area). This is normal and will fade in time. It might take 6-12 months for this to happen.     *Massaging the area will help the scar soften and fade quicker. Begin to massage the area one month after the bandages have been removed. To massage apply pressure directly and firmly over the scar with the  fingertips and move in a circular motion. Massage the area for a few minutes several times a day. Continue to massage the site for several months.    *Approximately 6-8 weeks after surgery it is not uncommon to see the formation of  tender pimple-like  bump along the scar. This is normal. As the scar continues to mature and the stitches underneath the skin begin to dissolve, this might occur. Do not pick or squeeze, this will resolve on it s own. Should one break open producing a small amount of drainage, apply Polysporin or Bacitracin ointment a few times a day until the wound is completely healed.    *Numbness in the surgical area is expected. It might take 12-18 months for the feeling to return to normal. During this time sensations of itchiness, tingling and occasional sharp pains might be noted. These feelings are normal and will subside once the nerves have completely healed.         IN CASE OF EMERGENCY: Dr Lea [phone]     If you were seen in [place] call: [phone]    If you were seen in [place] call: [phone]            Follow-ups after your visit        Your next 10 appointments already scheduled     Jul 30, 2018  2:30 PM CDT   Return Visit [facility]   [facility]    [address] [address] [phone]              Who to contact     If you have questions or need follow up information about today's clinic visit or your schedule please contact [facility] directly at [phone].  Normal or non-critical lab and imaging results will be communicated to you by MyChart, letter or phone within 4 business days after the clinic has received the results. If you do not hear from us within 7 days, please contact the clinic through [employer] or phone. If you have a critical or abnormal lab result, we will notify you by phone as soon as possible.  Submit refill requests through [employer] or call your pharmacy and  they will forward the refill request to us. Please allow 3 business days for your refill to be completed.          Additional Information About Your Visit        Care EveryWhere ID     This is your Care EveryWhere ID. This could be used by other organizations to access your [place] medical records  [id number]         Blood Pressure from Last 3 Encounters:   07/23/18 117/59   07/16/18 130/53   06/06/18 149/72    Weight from Last 3 Encounters:   02/16/17 81.2 kg (179 lb)   02/07/17 81.2 kg (179 lb)   02/05/17 80.7 kg (178 lb)              Today, you had the following     No orders found for display       Primary Care Provider Office Phone # Fax #    Rex GONZÁLES Jeremy [phone]       [place] [id number]        Equal Access to Services     JAYCEE TENORIO : Lakeshia lorenzo Socelso, waaxda luqadaha, qaybta kaalmada robin, angelika guevara . So [facility] [phone].    ATENCIÓN: Si habla español, tiene a carver disposición servicios gratuitos de asistencia lingüística. Mariajose al [phone].    We comply with applicable federal civil rights laws and [place] laws. We do not discriminate on the basis of race, color, national origin, age, disability, sex, sexual orientation, or gender identity.            Thank you!     Thank you for choosing [facility]  for your care. Our goal is always to provide you with excellent care. Hearing back from our patients is one way we can continue to improve our services. Please take a few minutes to complete the written survey that you may receive in the mail after your visit with us. Thank you!             Your Updated Medication List - Protect others around you: Learn how to safely use, store and throw away your medicines at www.disposemymeds.org.          This list is accurate as of 7/30/18  2:15 PM.  Always use your most recent med list.                   Brand Name Dispense Instructions for  use Diagnosis    aspirin 81 MG tablet      Take  by mouth daily.        CENTRUM SILVER per tablet      Take 1 tablet by mouth daily        CLOPIDOGREL BISULFATE PO      Take 75 mg by mouth daily        FERROUS GLUCONATE PO      Take 324 mg by mouth daily (with breakfast)        HYDRALAZINE HCL PO      Take 10 mg by mouth every 8 hours        isosorbide mononitrate 60 MG 24 hr tablet    IMDUR     Take 30 mg by mouth daily        LIPITOR PO      Take 40 mg by mouth daily        METOPROLOL SUCCINATE ER PO      Take 25 mg by mouth daily        nitroGLYcerin 0.4 MG sublingual tablet    NITROSTAT     Place  under the tongue every 5 minutes as needed.        polyethylene glycol powder    MIRALAX/GLYCOLAX     Take 1 capful by mouth daily        PROTONIX 40 MG packet   Generic drug:  pantoprazole sodium      Take 1 packet by mouth daily.        SPIRONOLACTONE PO      Take 25 mg by mouth daily        TORSEMIDE PO      Take 20 mg by mouth daily        triamcinolone 0.1 % cream    KENALOG     Apply topically 2 times daily

## 2018-10-15 PROBLEM — G20.C: Status: ACTIVE | Noted: 2018-01-01

## 2018-10-15 PROBLEM — N18.30 CKD (CHRONIC KIDNEY DISEASE), STAGE III (H): Status: ACTIVE | Noted: 2017-01-21

## 2018-10-15 PROBLEM — I95.1: Status: ACTIVE | Noted: 2018-01-01

## 2018-10-15 PROBLEM — G30.1 LATE ONSET ALZHEIMER'S DISEASE WITHOUT BEHAVIORAL DISTURBANCE (H): Status: ACTIVE | Noted: 2018-01-01

## 2018-10-15 PROBLEM — Z95.2 S/P TAVR (TRANSCATHETER AORTIC VALVE REPLACEMENT): Status: ACTIVE | Noted: 2018-01-01

## 2018-10-15 PROBLEM — B37.2 INTERTRIGINOUS CANDIDIASIS: Status: ACTIVE | Noted: 2017-01-22

## 2018-10-15 PROBLEM — R62.7 FAILURE TO THRIVE IN ADULT: Status: ACTIVE | Noted: 2018-01-01

## 2018-10-15 PROBLEM — F02.80 LATE ONSET ALZHEIMER'S DISEASE WITHOUT BEHAVIORAL DISTURBANCE (H): Status: ACTIVE | Noted: 2018-01-01

## 2018-10-15 NOTE — PROGRESS NOTES
[facility]  PRIMARY CARE PROVIDER AND CLINIC:  [facility] [address] [facility] [address]  Chief Complaint   Patient presents with     Clinic Care Coordination - Initial     [place] Medical Record Number:  [id number]  Place of Service where encounter took place:  [facility] ([facility]) [[id number]]    HPI:    Ramon Mora is a 85 year old  (7/17/1933),admitted to the above facility from  [facility].  Hospital stay 10/7/18  through 10/11/18.  Admitted to this facility for  rehab, medical management and nursing careand Hospice.  HPI information obtained from: facility chart records, facility staff, patient report and Care Everywhere Epic chart review.  Current issues are:          BRIEF HOSPITAL COURSE: This patient Ramon Mora is a 85 y.o. male who has a history of chronic systolic CHF, TAVR, ischemic cardiomyopathy, right carotid artery bruit, CKD, GERD, Alzheimer's, parkinson, prostate cancer, DM 2 who presented by ambulance to [facility] on 10/7/2018 due to weakness. In the emergency department, vitals were stable. UA negative. Wbc 9.2, hgb 12.0. TSH 1.17. BUN 40, creatinine 1.83, sodium 139. Lactate 0.6. AST 62. PSA on 8/22/18 noted to be 18.9 on chart review. He underwent a CT scan of his abdomen/pelvis/chest. Please see reports below. He did have a significant left pleural effusion with left upper lobe opacity. He did undergo a diagnostic thoracentesis on 10/9/2018 which removed 800 cc of fluid. He had a repeat cardiac echo which revealed a decreased EF of approximately 15%. This is a change from his previous 25% in 2017. During his hospital stay his creatinine improved from an admission 1.83 to 1.27 to 1.13. Potassium was stable at 4.1. No early morning on 10/10/2018 the patient sustained some chest discomfort which improved with nitro. His EKG showed a left bundle branch block. His troponin was elevated at 4.79. The day  prior to this chest pain and cardiac event the patient and family had a transitional care conference and agreed to attempt transitional care and rehab. After his cardiac event on 10/10/2018 the patient and family agreed to nursing home placement on hospice cares. The patient has had hypotension despite IV fluids and holding his blood pressure medications. He did not appear to be septic. The patient was then discharged to a nursing home on hospice cares on 10/11/2018. The patient appeared comfortable. The patient was thankful and understanding of hospice cares and his current diagnoses. The majority of his blood pressure meds were held at time of discharge. His Demadex dose was decreased due to lower blood pressures. Patient family were understanding of the plan and in agreement.      CODE STATUS/ADVANCE DIRECTIVES DISCUSSION:   DNR  Patient's living condition: lives with spouse    ALLERGIES:Review of patient's allergies indicates no known allergies.  PAST MEDICAL HISTORY:  has a past medical history of Coronary artery disease; Diabetes mellitus (H); Malignant neoplasm (H); and Squamous cell carcinoma. He also has no past medical history of Basal cell carcinoma or Malignant melanoma (H).  PAST SURGICAL HISTORY:  has a past surgical history that includes Cardiac surgery; orthopedic surgery (1994); ENT surgery; appendectomy; and Eye surgery.  FAMILY HISTORY: family history includes Cancer - colorectal (age of onset: 64) in his brother.  SOCIAL HISTORY:  reports that he has quit smoking. He has never used smokeless tobacco. He reports that he drinks alcohol. He reports that he does not use illicit drugs.    Post Discharge Medication Reconciliation Status: discharge medications reconciled and changed, per note/orders (see AVS).  Current Outpatient Prescriptions   Medication Sig Dispense Refill     ASPIRIN EC PO Take 81 mg by mouth daily       Atorvastatin Calcium (LIPITOR PO) Take 40 mg by mouth daily       donepezil  "(ARICEPT) 5 MG tablet Take 5 mg by mouth At Bedtime       nitroglycerin (NITROSTAT) 0.4 MG SL tablet Place  under the tongue every 5 minutes as needed.       pantoprazole sodium (PROTONIX) 40 MG tablet Take 1 packet by mouth daily.       SPIRONOLACTONE PO Take 25 mg by mouth daily       TORSEMIDE PO Take 20 mg by mouth daily         ROS:  Limited secondary to cognitive impairment but today pt reports  Denies pain, reports sleeping well, denies chest pain or shortness of breath   Appetite is good   Denies concerns with bowel or bladder     Exam:  /65  Pulse 79  Temp 97.7  F (36.5  C)  Resp 20  Ht 5' 10\" (1.778 m)  Wt 171 lb 12.8 oz (77.9 kg)  SpO2 92%  BMI 24.65 kg/m2  GENERAL APPEARANCE:  Alert, in no distress, appears chronically ill  EYES:  EOM, conjunctivae, lids, pupils and irises normal  RESP:  respiratory effort and palpation of chest normal, lungs clear to auscultation , no respiratory distress  CV:  Palpation and auscultation of heart done , regular rate and rhythm, no murmur, rub, or gallop  ABDOMEN:  normal bowel sounds, soft, nontender, no hepatosplenomegaly or other masses  M/S:   Gait and station abnormal weakness, requires assistance with transfering   Digits and nails normal  SKIN:  Palpation of skin and subcutaneous tissue baseline, wound appearance: ulcer on cocycc as picture below    Stage III pressure injury: left upper buttock  2.3x3cm wound bed has 100% slough with moderate purulent drainage noted. Surrounding skin appears macerated with slight blanchable erythematous  Stage IV pressure injury POA located on coccyx is measuring 2.1x1cm with tunneling at 12 o clock measuring 0.4cm, tunneling at 3oclock measuring 0.3cm and measuring at 0900oclock measuring 0.4cm. Wound bed is 75% slough and 25% granulation tissue. There is heavy purulent drainage noted. Surrounding tissue appears macerated with slight blanchable erythematous. With a musty odor noted.  Stage II pressure injury POA " located on right lower buttock is measuring 1.2x1cm with 75% slough and 25% granulation tissue. Surrounding skin appears macerated and slight blanchable erythematous. With a musty odor noted.  Stage II pressure injury POA located on left mid posterior thigh is now healed and appears to be contact dermatitis.                  NEURO:   Cranial nerves 2-12 are normal tested and grossly at patient's baseline  PSYCH:  memory impaired , affect and mood normal, alert to person and place    Lab/Diagnostic data:    CBC RESULTS:   Recent Labs   Lab Test  02/19/16   1357   WBC  5.6   RBC  3.96*   HGB  11.8*   HCT  36.5*   MCV  92   MCH  29.8   MCHC  32.3   RDW  13.8   PLT  244       Last Basic Metabolic Panel:  Recent Labs   Lab Test 02/08/17 02/19/16   1357   NA   --   143   POTASSIUM  4.4  4.0   CHLORIDE   --   107   ANSHU   --   8.3*   CO2   --   30   BUN   --   28   CR  1.16  1.15   GLC  101  106*       Liver Function Studies -   Recent Labs   Lab Test 02/08/17 02/19/16   1357   PROTTOTAL   --   6.6*   ALBUMIN   --   3.5   BILITOTAL   --   0.5   ALKPHOS   --   95   AST  16  15   ALT  12  19       No results found for: TSH]    No results found for: A1C    ASSESSMENT/PLAN:  Hospice care patient  Through [facility]   Long term plan is to transfer to [facility] in [place]- awaiting bed placement   Patient confirms goal of care is comfort     ASHD (arteriosclerotic heart disease)  Chronic systolic CHF (congestive heart failure) (H)  Cardiomyopathy, ischemic  He is on ASA, statin, spironolactone and torsemide   Goal is comfort after recent cardiac event as detailed above     Failure to 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  Hospice care     Late onset Alzheimer's disease without behavioral disturbance  He is on aricept       Pressure injury of sacral region, stage 3  Continue wound care orders   On pressure relieving mattress   Discussed with patient need for repositioning       Type 2 diabetes mellitus with complication, without long-term  current use of insulin (H)  Last A1c was 6.3 on 8/22/18  Will discontinue glipizide   Goal is comfort   Gaol A1C 8-9 due to life expectancy        Orders:  1.  discontinue glipizide      Total time spent with patient visit at the [facility] nursing facility was 35 min including patient visit, review of past records and messgae left with spouse . Greater than 50% of total time spent with counseling and coordinating care due to impaired cognition    Electronically signed by:  Laquita Valle NP

## 2018-10-16 NOTE — MR AVS SNAPSHOT
After Visit Summary   10/16/2018    Ramon Mora    MRN: [id number]           Patient Information     Date Of Birth          7/17/1933        Visit Information        Provider Department      10/16/2018 7:40 AM Laquita Valle NP [facility]        Today's Diagnoses     ASHD (arteriosclerotic heart disease)    -  1    Hospice care patient        Chronic systolic CHF (congestive heart failure) (H)        Cardiomyopathy, ischemic        Failure to 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        Late onset Alzheimer's disease without behavioral disturbance        Parkinsonism with orthostatic hypotension (H)        Pressure injury of sacral region, stage 2        Type 2 diabetes mellitus with complication, without long-term current use of insulin (H)           Follow-ups after your visit        Your next 10 appointments already scheduled     Oct 17, 2018 10:30 AM CDT   Nursing Home with Fifi Powell MD   [facility]    [address] [place] [address] [phone]              Who to contact     If you have questions or need follow up information about today's clinic visit or your schedule please contact [facility] directly at [phone].  Normal or non-critical lab and imaging results will be communicated to you by MyChart, letter or phone within 4 business days after the clinic has received the results. If you do not hear from us within 7 days, please contact the clinic through MyChart or phone. If you have a critical or abnormal lab result, we will notify you by phone as soon as possible.  Submit refill requests through [employer] or call your pharmacy and they will forward the refill request to us. Please allow 3 business days for your refill to be completed.          Additional Information About Your Visit        Care EveryWhere ID     This is your Care EveryWhere ID. This could be used by other organizations to access your  "[place] medical records  [id number]        Your Vitals Were     Pulse Temperature Respirations Height Pulse Oximetry BMI (Body Mass Index)    79 97.7  F (36.5  C) 20 5' 10\" (1.778 m) 92% 24.65 kg/m2       Blood Pressure from Last 3 Encounters:   10/15/18 116/65   07/23/18 117/59   07/16/18 130/53    Weight from Last 3 Encounters:   10/15/18 171 lb 12.8 oz (77.9 kg)   02/16/17 179 lb (81.2 kg)   02/07/17 179 lb (81.2 kg)              Today, you had the following     No orders found for display         Today's Medication Changes          These changes are accurate as of 10/16/18  3:12 PM.  If you have any questions, ask your nurse or doctor.               These medicines have changed or have updated prescriptions.        Dose/Directions    ASPIRIN EC PO   This may have changed:  Another medication with the same name was removed. Continue taking this medication, and follow the directions you see here.        Dose:  81 mg   Take 81 mg by mouth daily   Refills:  0         Stop taking these medicines if you haven't already. Please contact your care team if you have questions.     CENTRUM SILVER per tablet           CLOPIDOGREL BISULFATE PO           FERROUS GLUCONATE PO           GLIPIZIDE PO           HYDRALAZINE HCL PO           isosorbide mononitrate 60 MG 24 hr tablet   Commonly known as:  IMDUR           METOPROLOL SUCCINATE ER PO           polyethylene glycol powder   Commonly known as:  MIRALAX/GLYCOLAX           triamcinolone 0.1 % cream   Commonly known as:  KENALOG                    Primary Care Provider Office Phone # Fax #    Rex GONZÁLES [facility] [phone]       [address] [address]        Equal Access to Services     [facility] COBY : Lakeshia Dixon, krissy ruiz, qaybta kaalangelika sanchez. So [facility] [phone].    ATENCIÓN: Si habla español, tiene a carver disposición servicios gratuitos de asistencia " lingüística. Mariajose al [phone].    We comply with applicable federal civil rights laws and [place] laws. We do not discriminate on the basis of race, color, national origin, age, disability, sex, sexual orientation, or gender identity.            Thank you!     Thank you for choosing [facility]  for your care. Our goal is always to provide you with excellent care. Hearing back from our patients is one way we can continue to improve our services. Please take a few minutes to complete the written survey that you may receive in the mail after your visit with us. Thank you!             Your Updated Medication List - Protect others around you: Learn how to safely use, store and throw away your medicines at www.disposemymeds.org.          This list is accurate as of 10/16/18  3:12 PM.  Always use your most recent med list.                   Brand Name Dispense Instructions for use Diagnosis    ASPIRIN EC PO      Take 81 mg by mouth daily        donepezil 5 MG tablet    [employer]cept     Take 5 mg by mouth At Bedtime        LIPITOR PO      Take 40 mg by mouth daily        nitroGLYcerin 0.4 MG sublingual tablet    NITROSTAT     Place  under the tongue every 5 minutes as needed.        PROTONIX 40 MG packet   Generic drug:  pantoprazole sodium      Take 1 packet by mouth daily.        SPIRONOLACTONE PO      Take 25 mg by mouth daily        TORSEMIDE PO      Take 20 mg by mouth daily

## 2018-10-16 NOTE — PROGRESS NOTES
[place] [facility]  PRIMARY CARE PROVIDER AND CLINIC:  [facility] [address] [facility]  Chief Complaint   Patient presents with     Hospital F/U     [place] Medical Record Number:  [id number]  Place of Service where encounter took place:  [facility] ([facility] [[id number]]    HPI:    Ramon Mora is a 85 year old  (7/17/1933),admitted to the above facility from  [facility].  Hospital stay 10/7/18  through 10/11/18.  Admitted to this facility for  rehab, medical management, nursing care, hospice and long term care.  HPI information obtained from: facility staff, patient report and [facility] chart review.      briefly Patient with past medical history Significant for Parkinson's disease, dementia, prostatic cancer admitted to the above hospital for generalized weakness was found to have significant left pleural effusion with left upper lobe opacity status post thoracentesis, echo showed ejection fraction 50%.  Hospitalization complicated with left bundle branch block with elevated troponin.  Enrolled in hospice    Current issues are:    - Pt seen and examined today.  - reports appetite, sleep and BM are fine. Feels tired all the time  - denies chest pain SOB or MSK pain.   =========================================================================.     CODE STATUS/ADVANCE DIRECTIVES DISCUSSION:   DNR / DNI  Patient's living condition: lives with spouse    ALLERGIES:Review of patient's allergies indicates no known allergies.  PAST MEDICAL HISTORY:  has a past medical history of Coronary artery disease; Diabetes mellitus (H); Malignant neoplasm (H); and Squamous cell carcinoma. He also has no past medical history of Basal cell carcinoma or Malignant melanoma (H).  PAST SURGICAL HISTORY:  has a past surgical history that includes Cardiac surgery; orthopedic surgery (1994); ENT surgery; appendectomy; and Eye surgery.  FAMILY HISTORY: family  "history includes Cancer - colorectal (age of onset: 64) in his brother.  SOCIAL HISTORY:  reports that he has quit smoking. He has never used smokeless tobacco. He reports that he drinks alcohol. He reports that he does not use illicit drugs.    Post Discharge Medication Reconciliation Status: discharge medications reconciled and changed, per note/orders (see AVS).  Current Outpatient Prescriptions   Medication Sig Dispense Refill     ASPIRIN EC PO Take 81 mg by mouth daily       Atorvastatin Calcium (LIPITOR PO) Take 40 mg by mouth daily       donepezil (ARICEPT) 5 MG tablet Take 5 mg by mouth At Bedtime       nitroglycerin (NITROSTAT) 0.4 MG SL tablet Place  under the tongue every 5 minutes as needed.       pantoprazole sodium (PROTONIX) 40 MG tablet Take 1 packet by mouth daily.       SPIRONOLACTONE PO Take 25 mg by mouth daily       TORSEMIDE PO Take 20 mg by mouth daily         ROS:  10 point ROS of systems including Constitutional, Eyes, Respiratory, Cardiovascular, Gastroenterology, Genitourinary, Integumentary, Musculoskeletal, Psychiatric were all negative except for pertinent positives noted in my HPI.    Exam:  /51  Pulse 73  Temp 96.6  F (35.9  C)  Resp 19  Ht 5' 10\" (1.778 m)  Wt 171 lb 12.8 oz (77.9 kg)  SpO2 95%  BMI 24.65 kg/m2  GENERAL APPEARANCE:  in no distress, tired looking   ENT:  Mouth and posterior oropharynx normal, moist mucous membranes  EYES:  EOM, conjunctivae, lids, pupils and irises normal  RESP:  respiratory effort and palpation of chest normal, lungs clear to auscultation , no respiratory distress  CV:  Palpation and auscultation of heart done , S1S2 normal. irregualr HR, no murmur apreciated.   ABDOMEN:  normal bowel sounds, soft, nontender, no hepatosplenomegaly or other masses  M/S:   Gait and station abnormal uses Wc.   SKIN:  ecchymosis over forearms.   NEURO:   no NFD appreciated on observation  PSYCH:  oriented X 3, affect and mood normal    Lab/Diagnostic " data:    CBC WITH AUTO DIFFERENTIAL (10/09/2018 6:20 AM)  Only the most recent of 2 results within the time period is included.    CBC WITH AUTO DIFFERENTIAL (10/09/2018 6:20 AM)   Component Value Ref Range Performed At   WHITE BLOOD COUNT  8.6 4.5 - 11.0 thou/cu mm [facility]   RED BLOOD COUNT  3.10 (L) 4.30 - 5.90 mil/cu mm [facility]   HEMOGLOBIN  10.2 (L) 13.5 - 17.5 g/dL [facility]   HEMATOCRIT  31.8 (L) 37.0 - 53.0 % [facility]   MCV  103 (H) 80 - 100 fL [facility]   MCH  32.9 26.0 - 34.0 pg [facility]   MCHC  32.1 32.0 - 36.0 g/dL [facility]   RDW  13.2 11.5 - 15.5 % [facility]   PLATELET COUNT  250 140 - 440 thou/cu mm [facility]   MPV  9.8 6.5 - 11.0 fL [facility]   NEUTROPHILS  79.9 % [facility]   LYMPHOCYTES  5.5 % [facility]   MONOCYTES  9.2 % [facility]   EOSINOPHILS  4.9 % [facility]   BASOPHILS  0.4 % [facility]   IMMATURE GRANULOCYTES(METAS,MYELOS,PROS) 0.1 % [facility]   ABSOLUTE NEUTROPHILS  6.8 1.7 - 7.0 thou/cu mm [facility]   ABSOLUTE LYMPHOCYTES  0.5 (L) 0.9 - 2.9 thou/cu mm [facility]   ABSOLUTE MONOCYTES  0.8 <0.9 thou/cu mm [facility]   ABSOLUTE EOSINOPHILS  0.4 <0.5 thou/cu mm [facility]   ABSOLUTE BASOPHILS  0.0 <0.3 thou/cu mm [facility]   ABSOLUTE IMMATURE GRANULOCYTES(METAS,MYELOS,PROS) 0.0 <0.3 thou/cu mm [facility]     CBC WITH AUTO DIFFERENTIAL (10/09/2018 6:20 AM)   Specimen   Blood - Blood     CBC WITH AUTO DIFFERENTIAL (10/09/2018 6:20 AM)   Performing Organization Address City/State/Zipcode Phone Number   [facility]   [address] [place] [address]        Comp Metabolic Panel (10/07/2018 5:30 PM)  Comp Metabolic Panel (10/07/2018 5:30 PM)   Component  Value Ref Range Performed At   SODIUM 139 135 - 145 mmol/L [facility]   POTASSIUM 4.9 3.5 - 5.0 mmol/L [facility]   CHLORIDE 101 98 - 110 mmol/L [facility]   CO2,TOTAL 29 21 - 31 mmol/L [facility]   ANION GAP 9 5 - 18  [facility]   GLUCOSE 104 (H) 65 - 100 mg/dL [facility]   CALCIUM 8.8 8.5 - 10.5 mg/dL [facility]   BUN 40 (H) 8 - 25 mg/dL [facility]   CREATININE 1.83 (H) 0.72 - 1.25 mg/dL [facility]   BUN/CREAT RATIO  22 (H) 10 - 20  [facility]   GFR if African American 43 (L) >60 ml/min/1.73m2 [facility]   GFR if not African American 35 (L) >60 ml/min/1.73m2 [facility]   ALBUMIN 3.6 3.2 - 4.6 g/dL [facility]   PROTEIN,TOTAL 6.7 6.0 - 8.0 g/dL [facility]   GLOBULIN  3.1 2.0 - 3.7 g/dL [facility]   A/G RATIO 1.2 1.0 - 2.0  [facility]   BILIRUBIN,TOTAL 0.8 0.2 - 1.2 mg/dL [facility]   ALK PHOSPHATASE 123 50 - 136 IU/L [facility]   ALT (SGPT) 23 8 - 45 IU/L [facility]   AST (SGOT) 62 (H) 2 - 40 IU/L [facility]     Comp Metabolic Panel (10/07/2018 5:30 PM)   Specimen   Blood - Blood     Comp Metabolic Panel (10/07/2018 5:30 PM)   Performing Organization Address City/[facility] Phone Number   [facility]   [address] [place] [address]              ASSESSMENT/PLAN:  ASHD (arteriosclerotic heart disease)  Chronic systolic CHF (congestive heart failure) (H)  Cardiomyopathy, ischemic  - at baseline.    Pressure injury of sacral region, stage 3  --  Wound care for comfort purpose.     Type 2 diabetes mellitus with complication, without long-term current use of insulin (H)  - Last A1c was 6.3 on 8/22/18. Off glipizide  Now. Agree.   - keep A1C 8-9 due to life expectancy     Late onset Alzheimer's disease without behavioral  disturbance  - Continue to anticipate needs.  Continue to provide redirection and reassurance as needed    Failure to 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  Hospice care   - Symptoms managed by [facility] and Hospice Team.  - consider stopping lipitor and ASA, GDR Protonix and donepezil.     Orders:  - See above, otherwise, continue the rest of the current POC.       Electronically signed by:  Fifi Powell MD

## 2018-10-16 NOTE — LETTER
10/16/2018        RE: Ramon Mora  [address] [address]        [place] [facility]  PRIMARY CARE PROVIDER AND CLINIC:  [facility] [address] [facility]*  Chief Complaint   Patient presents with     Clinic Care Coordination - Initial     [place] Medical Record Number:  [id number]  Place of Service where encounter took place:  [facility] [[id number]]    HPI:    Ramon Mora is a 85 year old  (7/17/1933),admitted to the above facility from  [facility].  Hospital stay 10/7/18  through 10/11/18.  Admitted to this facility for  rehab, medical management and nursing careand Hospice.  HPI information obtained from: facility chart records, facility staff, patient report and Care Everywhere Epic chart review.  Current issues are:          BRIEF HOSPITAL COURSE: This patient Ramon Mora is a 85 y.o. male who has a history of chronic systolic CHF, TAVR, ischemic cardiomyopathy, right carotid artery bruit, CKD, GERD, Alzheimer's, parkinson, prostate cancer, DM 2 who presented by ambulance to [facility] on 10/7/2018 due to weakness. In the emergency department, vitals were stable. UA negative. Wbc 9.2, hgb 12.0. TSH 1.17. BUN 40, creatinine 1.83, sodium 139. Lactate 0.6. AST 62. PSA on 8/22/18 noted to be 18.9 on chart review. He underwent a CT scan of his abdomen/pelvis/chest. Please see reports below. He did have a significant left pleural effusion with left upper lobe opacity. He did undergo a diagnostic thoracentesis on 10/9/2018 which removed 800 cc of fluid. He had a repeat cardiac echo which revealed a decreased EF of approximately 15%. This is a change from his previous 25% in 2017. During his hospital stay his creatinine improved from an admission 1.83 to 1.27 to 1.13. Potassium was stable at 4.1. No early morning on 10/10/2018 the patient sustained some chest discomfort which improved  with nitro. His EKG showed a left bundle branch block. His troponin was elevated at 4.79. The day prior to this chest pain and cardiac event the patient and family had a transitional care conference and agreed to attempt transitional care and rehab. After his cardiac event on 10/10/2018 the patient and family agreed to nursing home placement on hospice cares. The patient has had hypotension despite IV fluids and holding his blood pressure medications. He did not appear to be septic. The patient was then discharged to a nursing home on hospice cares on 10/11/2018. The patient appeared comfortable. The patient was thankful and understanding of hospice cares and his current diagnoses. The majority of his blood pressure meds were held at time of discharge. His Demadex dose was decreased due to lower blood pressures. Patient family were understanding of the plan and in agreement.      CODE STATUS/ADVANCE DIRECTIVES DISCUSSION:   DNR  Patient's living condition: lives with spouse    ALLERGIES:Review of patient's allergies indicates no known allergies.  PAST MEDICAL HISTORY:  has a past medical history of Coronary artery disease; Diabetes mellitus (H); Malignant neoplasm (H); and Squamous cell carcinoma. He also has no past medical history of Basal cell carcinoma or Malignant melanoma (H).  PAST SURGICAL HISTORY:  has a past surgical history that includes Cardiac surgery; orthopedic surgery (1994); ENT surgery; appendectomy; and Eye surgery.  FAMILY HISTORY: family history includes Cancer - colorectal (age of onset: 64) in his brother.  SOCIAL HISTORY:  reports that he has quit smoking. He has never used smokeless tobacco. He reports that he drinks alcohol. He reports that he does not use illicit drugs.    Post Discharge Medication Reconciliation Status: discharge medications reconciled and changed, per note/orders (see AVS).  Current Outpatient Prescriptions   Medication Sig Dispense Refill     ASPIRIN EC PO Take 81 mg by  "mouth daily       Atorvastatin Calcium (LIPITOR PO) Take 40 mg by mouth daily       donepezil (ARICEPT) 5 MG tablet Take 5 mg by mouth At Bedtime       nitroglycerin (NITROSTAT) 0.4 MG SL tablet Place  under the tongue every 5 minutes as needed.       pantoprazole sodium (PROTONIX) 40 MG tablet Take 1 packet by mouth daily.       SPIRONOLACTONE PO Take 25 mg by mouth daily       TORSEMIDE PO Take 20 mg by mouth daily         ROS:  Limited secondary to cognitive impairment but today pt reports  Denies pain, reports sleeping well, denies chest pain or shortness of breath   Appetite is good   Denies concerns with bowel or bladder     Exam:  /65  Pulse 79  Temp 97.7  F (36.5  C)  Resp 20  Ht 5' 10\" (1.778 m)  Wt 171 lb 12.8 oz (77.9 kg)  SpO2 92%  BMI 24.65 kg/m2  GENERAL APPEARANCE:  Alert, in no distress, appears chronically ill  EYES:  EOM, conjunctivae, lids, pupils and irises normal  RESP:  respiratory effort and palpation of chest normal, lungs clear to auscultation , no respiratory distress  CV:  Palpation and auscultation of heart done , regular rate and rhythm, no murmur, rub, or gallop  ABDOMEN:  normal bowel sounds, soft, nontender, no hepatosplenomegaly or other masses  M/S:   Gait and station abnormal weakness, requires assistance with transfering   Digits and nails normal  SKIN:  Palpation of skin and subcutaneous tissue baseline, wound appearance: ulcer on cocycc as picture below    Stage III pressure injury: left upper buttock  2.3x3cm wound bed has 100% slough with moderate purulent drainage noted. Surrounding skin appears macerated with slight blanchable erythematous  Stage IV pressure injury POA located on coccyx is measuring 2.1x1cm with tunneling at 12 o clock measuring 0.4cm, tunneling at 3oclock measuring 0.3cm and measuring at 0900oclock measuring 0.4cm. Wound bed is 75% slough and 25% granulation tissue. There is heavy purulent drainage noted. Surrounding tissue appears macerated " with slight blanchable erythematous. With a musty odor noted.  Stage II pressure injury POA located on right lower buttock is measuring 1.2x1cm with 75% slough and 25% granulation tissue. Surrounding skin appears macerated and slight blanchable erythematous. With a musty odor noted.  Stage II pressure injury POA located on left mid posterior thigh is now healed and appears to be contact dermatitis.                  NEURO:   Cranial nerves 2-12 are normal tested and grossly at patient's baseline  PSYCH:  memory impaired , affect and mood normal, alert to person and place    Lab/Diagnostic data:    CBC RESULTS:   Recent Labs   Lab Test  02/19/16   1357   WBC  5.6   RBC  3.96*   HGB  11.8*   HCT  36.5*   MCV  92   MCH  29.8   MCHC  32.3   RDW  13.8   PLT  244       Last Basic Metabolic Panel:  Recent Labs   Lab Test 02/08/17 02/19/16   1357   NA   --   143   POTASSIUM  4.4  4.0   CHLORIDE   --   107   ANSHU   --   8.3*   CO2   --   30   BUN   --   28   CR  1.16  1.15   GLC  101  106*       Liver Function Studies -   Recent Labs   Lab Test 02/08/17 02/19/16   1357   PROTTOTAL   --   6.6*   ALBUMIN   --   3.5   BILITOTAL   --   0.5   ALKPHOS   --   95   AST  16  15   ALT  12  19       No results found for: TSH]    No results found for: A1C    ASSESSMENT/PLAN:  Hospice care patient  Through [facility]   Long term plan is to transfer to [facility] in [place]- awaiting bed placement   Patient confirms goal of care is comfort     ASHD (arteriosclerotic heart disease)  Chronic systolic CHF (congestive heart failure) (H)  Cardiomyopathy, ischemic  He is on ASA, statin, spironolactone and torsemide   Goal is comfort after recent cardiac event as detailed above     Failure to 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  Hospice care     Late onset Alzheimer's disease without behavioral disturbance  He is on aricept       Pressure injury of sacral region, stage 3  Continue wound care orders   On pressure relieving mattress   Discussed with patient  need for repositioning       Type 2 diabetes mellitus with complication, without long-term current use of insulin (H)  Last A1c was 6.3 on 8/22/18  Will discontinue glipizide   Goal is comfort   Gaol A1C 8-9 due to life expectancy        Orders:  1.  discontinue glipizide      Total time spent with patient visit at the skilled nursing facility was 35 min including patient visit, review of past records and messgae left with spouse . Greater than 50% of total time spent with counseling and coordinating care due to impaired cognition    Electronically signed by:  Laquita Valle NP                      Sincerely,        Laquita Valle NP

## 2018-10-17 NOTE — LETTER
10/17/2018        RE: Ramon Mora  [address] [facility] [address]        [place] [facility]  PRIMARY CARE PROVIDER AND CLINIC:  [facility] [address] [facility] [address]*  Chief Complaint   Patient presents with     Hospital F/U     [place] Medical Record Number:  [id number]  Place of Service where encounter took place:  [facility] (S) [[id number]]    HPI:    Ramon Mora is a 85 year old  (7/17/1933),admitted to the above facility from  [facility].  Hospital stay 10/7/18  through 10/11/18.  Admitted to this facility for  rehab, medical management, nursing care, hospice and long term care.  HPI information obtained from: facility staff, patient report and [facility] chart review.      briefly Patient with past medical history Significant for Parkinson's disease, dementia, prostatic cancer admitted to the above hospital for generalized weakness was found to have significant left pleural effusion with left upper lobe opacity status post thoracentesis, echo showed ejection fraction 50%.  Hospitalization complicated with left bundle branch block with elevated troponin.  Enrolled in hospice    Current issues are:    - Pt seen and examined today.  - reports appetite, sleep and BM are fine. Feels tired all the time  - denies chest pain SOB or MSK pain.   =========================================================================.     CODE STATUS/ADVANCE DIRECTIVES DISCUSSION:   DNR / DNI  Patient's living condition: lives with spouse    ALLERGIES:Review of patient's allergies indicates no known allergies.  PAST MEDICAL HISTORY:  has a past medical history of Coronary artery disease; Diabetes mellitus (H); Malignant neoplasm (H); and Squamous cell carcinoma. He also has no past medical history of Basal cell carcinoma or Malignant melanoma (H).  PAST SURGICAL HISTORY:  has a past surgical history that includes Cardiac surgery;  "orthopedic surgery (1994); ENT surgery; appendectomy; and Eye surgery.  FAMILY HISTORY: family history includes Cancer - colorectal (age of onset: 64) in his brother.  SOCIAL HISTORY:  reports that he has quit smoking. He has never used smokeless tobacco. He reports that he drinks alcohol. He reports that he does not use illicit drugs.    Post Discharge Medication Reconciliation Status: discharge medications reconciled and changed, per note/orders (see AVS).  Current Outpatient Prescriptions   Medication Sig Dispense Refill     ASPIRIN EC PO Take 81 mg by mouth daily       Atorvastatin Calcium (LIPITOR PO) Take 40 mg by mouth daily       donepezil (ARICEPT) 5 MG tablet Take 5 mg by mouth At Bedtime       nitroglycerin (NITROSTAT) 0.4 MG SL tablet Place  under the tongue every 5 minutes as needed.       pantoprazole sodium (PROTONIX) 40 MG tablet Take 1 packet by mouth daily.       SPIRONOLACTONE PO Take 25 mg by mouth daily       TORSEMIDE PO Take 20 mg by mouth daily         ROS:  10 point ROS of systems including Constitutional, Eyes, Respiratory, Cardiovascular, Gastroenterology, Genitourinary, Integumentary, Musculoskeletal, Psychiatric were all negative except for pertinent positives noted in my HPI.    Exam:  /51  Pulse 73  Temp 96.6  F (35.9  C)  Resp 19  Ht 5' 10\" (1.778 m)  Wt 171 lb 12.8 oz (77.9 kg)  SpO2 95%  BMI 24.65 kg/m2  GENERAL APPEARANCE:  in no distress, tired looking   ENT:  Mouth and posterior oropharynx normal, moist mucous membranes  EYES:  EOM, conjunctivae, lids, pupils and irises normal  RESP:  respiratory effort and palpation of chest normal, lungs clear to auscultation , no respiratory distress  CV:  Palpation and auscultation of heart done , S1S2 normal. irregualr HR, no murmur apreciated.   ABDOMEN:  normal bowel sounds, soft, nontender, no hepatosplenomegaly or other masses  M/S:   Gait and station abnormal uses Wc.   SKIN:  ecchymosis over forearms.   NEURO:   no NFD " appreciated on observation  PSYCH:  oriented X 3, affect and mood normal    Lab/Diagnostic data:    CBC WITH AUTO DIFFERENTIAL (10/09/2018 6:20 AM)  Only the most recent of 2 results within the time period is included.    CBC WITH AUTO DIFFERENTIAL (10/09/2018 6:20 AM)   Component Value Ref Range Performed At   WHITE BLOOD COUNT  8.6 4.5 - 11.0 thou/cu mm [facility]   RED BLOOD COUNT  3.10 (L) 4.30 - 5.90 mil/cu mm [facility]   HEMOGLOBIN  10.2 (L) 13.5 - 17.5 g/dL [facility]   HEMATOCRIT  31.8 (L) 37.0 - 53.0 % [facility]   MCV  103 (H) 80 - 100 fL [facility]   MCH  32.9 26.0 - 34.0 pg [facility]   MCHC  32.1 32.0 - 36.0 g/dL [facility]   RDW  13.2 11.5 - 15.5 % [facility]   PLATELET COUNT  250 140 - 440 thou/cu mm [facility]   MPV  9.8 6.5 - 11.0 fL [facility]   NEUTROPHILS  79.9 % [facility]   LYMPHOCYTES  5.5 % [facility]   MONOCYTES  9.2 % [facility]   EOSINOPHILS  4.9 % [facility]   BASOPHILS  0.4 % [facility]   IMMATURE GRANULOCYTES(METAS,MYELOS,PROS) 0.1 % [facility]   ABSOLUTE NEUTROPHILS  6.8 1.7 - 7.0 thou/cu mm [facility]   ABSOLUTE LYMPHOCYTES  0.5 (L) 0.9 - 2.9 thou/cu mm [facility]   ABSOLUTE MONOCYTES  0.8 <0.9 thou/cu mm [facility]   ABSOLUTE EOSINOPHILS  0.4 <0.5 thou/cu mm [facility]   ABSOLUTE BASOPHILS  0.0 <0.3 thou/cu mm [facility]   ABSOLUTE IMMATURE GRANULOCYTES(METAS,MYELOS,PROS) 0.0 <0.3 thou/cu mm [facility]     CBC WITH AUTO DIFFERENTIAL (10/09/2018 6:20 AM)   Specimen   Blood - Blood     CBC WITH AUTO DIFFERENTIAL (10/09/2018 6:20 AM)   Performing Organization Address City/State/Zipcode Phone Number   [facility]   [address] [place] [address]        Comp  Metabolic Panel (10/07/2018 5:30 PM)  Comp Metabolic Panel (10/07/2018 5:30 PM)   Component Value Ref Range Performed At   SODIUM 139 135 - 145 mmol/L [facility]   POTASSIUM 4.9 3.5 - 5.0 mmol/L [facility]   CHLORIDE 101 98 - 110 mmol/L [facility]   CO2,TOTAL 29 21 - 31 mmol/L [facility]   ANION GAP 9 5 - 18  [facility]   GLUCOSE 104 (H) 65 - 100 mg/dL [facility]   CALCIUM 8.8 8.5 - 10.5 mg/dL [facility]   BUN 40 (H) 8 - 25 mg/dL [facility]   CREATININE 1.83 (H) 0.72 - 1.25 mg/dL [facility]   BUN/CREAT RATIO  22 (H) 10 - 20  [facility]   GFR if African American 43 (L) >60 ml/min/1.73m2 [facility]   GFR if not African American 35 (L) >60 ml/min/1.73m2 [facility]   ALBUMIN 3.6 3.2 - 4.6 g/dL [facility]   PROTEIN,TOTAL 6.7 6.0 - 8.0 g/dL [facility]   GLOBULIN  3.1 2.0 - 3.7 g/dL [facility]   A/G RATIO 1.2 1.0 - 2.0  [facility]   BILIRUBIN,TOTAL 0.8 0.2 - 1.2 mg/dL [facility]   ALK PHOSPHATASE 123 50 - 136 IU/L [facility]   ALT (SGPT) 23 8 - 45 IU/L [facility]   AST (SGOT) 62 (H) 2 - 40 IU/L [facility]     Comp Metabolic Panel (10/07/2018 5:30 PM)   Specimen   Blood - Blood     Comp Metabolic Panel (10/07/2018 5:30 PM)   Performing Organization Address City/State/Zipcode Phone Number   [facility]   [address] [place] [address]              ASSESSMENT/PLAN:  ASHD (arteriosclerotic heart disease)  Chronic systolic CHF (congestive heart failure) (H)  Cardiomyopathy, ischemic  - at baseline.    Pressure injury of sacral region, stage 3  --  Wound care for comfort purpose.     Type 2 diabetes mellitus with complication, without long-term current use of insulin (H)  - Last A1c was 6.3 on 8/22/18. Off glipizide  Now. Agree.    - keep A1C 8-9 due to life expectancy     Late onset Alzheimer's disease without behavioral disturbance  - Continue to anticipate needs.  Continue to provide redirection and reassurance as needed    Failure to 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  Hospice care   - Symptoms managed by [facility] and Hospice Team.  - consider stopping lipitor and ASA, GDR Protonix and donepezil.     Orders:  - See above, otherwise, continue the rest of the current POC.       Electronically signed by:  Fifi Powell MD                    Sincerely,        Fifi Powell MD

## 2018-10-29 NOTE — PROGRESS NOTES
"[place] [facility]    Chief Complaint   Patient presents with     jail Acute       [place] Medical Record Number:  [id number]  Place of Service where encounter took place:  [facility] ([facility]) [[id number]]    HPI:    Ramon Mora is a 85 year old  (7/17/1933), who is being seen today for an episodic care visit.  HPI information obtained from: facility chart records, facility staff and patient report. Today's concern is:    Staff reports that patient urinates frequently sometimes every 10 minutes   He denies pain   A UA/UC was ordered- unsure if this was ordered by hospice, results were normal   He has a history of BPH, prostate cancer and is on diuretics for CHF     He denies any concerns today except reports that he needs to be cleaned up due to incontinent stool     Reviewed his blood sugars which are     REVIEW OF SYSTEMS:  Unobtainable secondary to cognitive impairment.     /79  Pulse 61  Temp 98.1  F (36.7  C)  Resp 18  Ht 5' 10\" (1.778 m)  Wt 171 lb 12.8 oz (77.9 kg)  SpO2 95%  BMI 24.65 kg/m2  GENERAL APPEARANCE:  Alert, in no distress  RESP:  respiratory effort and palpation of chest normal, auscultation of lungs clear , no respiratory distress  CV:  Palpation and auscultation of heart done , rate and rhythm WNL, no murmur, no peripheral edema  ABDOMEN:  normal bowel sounds, soft, nontender, no hepatosplenomegaly or other masses  SKIN:  Inspection and Palpation of skin and subcutaneous tissue   NEURO: 2-12 in normal limits and at patient's baseline  PSYCH:  insight and judgement, memory impaired , affect and mood normal    ASSESSMENT/PLAN:  Type 2 diabetes mellitus with complication, without long-term current use of insulin (H)  Was on glipizide this was d/c'ed   Had nursing check blood glucose levels over the weekend which were normal   Will discharge blood glucose levels     Benign non-nodular prostatic hyperplasia with lower urinary tract symptoms  Prostate " cancer (H)  Chronic systolic CHF (congestive heart failure) (H)  BPH, prostate cancer and diuretics is likely cause of urine frequency     Late onset Alzheimer's disease without behavioral disturbance  On aricept     Hospice care patient   [facility]        Orders:  1.  discontinue glucose checks      Total time spent with patient visit at the skilled nursing facility was 25 min including patient visit and review of past records. Greater than 50% of total time spent with counseling and coordinating care due to impaired cognition.      Electronically signed by:  Laquita Valle NP

## 2018-10-30 NOTE — MR AVS SNAPSHOT
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  Normal or non-critical lab and imaging results will be communicated to you by MyChart, letter or phone within 4 business days after the clinic has received the results. If you do not hear from us within 7 days, please contact the clinic through MyChart or phone. If you have a critical or abnormal lab result, we will notify you by phone as soon as possible.  Submit refill requests through [employer] or call your pharmacy and they will forward the refill request to us. Please allow 3 business days for your refill to be completed.          Additional Information About Your Visit        Care EveryWhere ID     This is your Care EveryWhere ID. This could be used by other organizations to access your [facility] medical records  [id number]        Your Vitals Were     Pulse Temperature Respirations Height Pulse Oximetry BMI (Body Mass Index)    61 98.1  F (36.7  C) 18 5' 10\" (1.778 m) 95% 24.65 kg/m2       Blood Pressure from Last 3 Encounters:   10/29/18 133/79   10/16/18 109/51   10/15/18 116/65    Weight from Last 3 Encounters:   10/29/18 171 lb 12.8 " oz (77.9 kg)   10/16/18 171 lb 12.8 oz (77.9 kg)   10/15/18 171 lb 12.8 oz (77.9 kg)              Today, you had the following     No orders found for display       Primary Care Provider Office Phone # Fax #    [facility] [phone]       [facility] [address] [place] [address]        Equal Access to Services     JAYCEE TENORIO : Hadii aad ku hadasho Soomaali, waaxda luqadaha, qaybta kaalmada adeegyada, waxay idiin hayaan adeeg khelaine laDennysonn ah. So [facility] [phone].    ATENCIÓN: Si boyla lisa, tiene a carver disposición servicios gratuitos de asistencia lingüística. Llame al [phone].    We comply with applicable federal civil rights laws and [place] laws. We do not discriminate on the basis of race, color, national origin, age, disability, sex, sexual orientation, or gender identity.            Thank you!     Thank you for choosing [facility]  for your care. Our goal is always to provide you with excellent care. Hearing back from our patients is one way we can continue to improve our services. Please take a few minutes to complete the written survey that you may receive in the mail after your visit with us. Thank you!             Your Updated Medication List - Protect others around you: Learn how to safely use, store and throw away your medicines at www.disposemymeds.org.          This list is accurate as of 10/30/18 12:14 PM.  Always use your most recent med list.                   Brand Name Dispense Instructions for use Diagnosis    ASPIRIN EC PO      Take 81 mg by mouth daily        donepezil 5 MG tablet    ARIcept     Take 5 mg by mouth At Bedtime        LIPITOR PO      Take 40 mg by mouth daily        nitroGLYcerin 0.4 MG sublingual tablet    NITROSTAT     Place  under the tongue every 5 minutes as needed.        PROTONIX 40 MG packet   Generic drug:  pantoprazole sodium      Take 1 packet by mouth daily.        SPIRONOLACTONE PO      Take 25 mg by  mouth daily        TORSEMIDE PO      Take 20 mg by mouth daily

## 2018-10-30 NOTE — LETTER
"    10/30/2018        RE: Ramon Mora  [address] [facility] [address]        [place] [facility]    Chief Complaint   Patient presents with     long term Acute       [place] Medical Record Number:  [id number]  Place of Service where encounter took place:  [facility] [[id number]]    HPI:    Ramon Mora is a 85 year old  (7/17/1933), who is being seen today for an episodic care visit.  HPI information obtained from: facility chart records, facility staff and patient report. Today's concern is:    Staff reports that patient urinates frequently sometimes every 10 minutes   He denies pain   A UA/UC was ordered- unsure if this was ordered by hospice, results were normal   He has a history of BPH, prostate cancer and is on diuretics for CHF     He denies any concerns today except reports that he needs to be cleaned up due to incontinent stool     Reviewed his blood sugars which are     REVIEW OF SYSTEMS:  Unobtainable secondary to cognitive impairment.     /79  Pulse 61  Temp 98.1  F (36.7  C)  Resp 18  Ht 5' 10\" (1.778 m)  Wt 171 lb 12.8 oz (77.9 kg)  SpO2 95%  BMI 24.65 kg/m2  GENERAL APPEARANCE:  Alert, in no distress  RESP:  respiratory effort and palpation of chest normal, auscultation of lungs clear , no respiratory distress  CV:  Palpation and auscultation of heart done , rate and rhythm WNL, no murmur, no peripheral edema  ABDOMEN:  normal bowel sounds, soft, nontender, no hepatosplenomegaly or other masses  SKIN:  Inspection and Palpation of skin and subcutaneous tissue   NEURO: 2-12 in normal limits and at patient's baseline  PSYCH:  insight and judgement, memory impaired , affect and mood normal    ASSESSMENT/PLAN:  Type 2 diabetes mellitus with complication, without long-term current use of insulin (H)  Was on glipizide this was d/c'ed   Had nursing check blood glucose levels over the weekend which were normal   Will discharge blood glucose " levels     Benign non-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  discontinue glucose checks      Total time spent with patient visit at the skilled nursing facility was 25 min including patient visit and review of past records. Greater than 50% of total time spent with counseling and coordinating care due to impaired cognition.      Electronically signed by:  Laquita Valle NP      Sincerely,        Laquita Valle NP

## 2018-11-13 NOTE — PROGRESS NOTES
[place] [facility]    Chief Complaint   Patient presents with     jail Regulatory       [place] Medical Record Number:  [id number]  Place of Service where encounter took place:  [facility] ([facility]) [[id number]]    HPI:    Ramon Mora is a 85 year old  (7/17/1933), who is being seen today for a federally mandated E/M visit.  HPI information obtained from: facility chart records, facility staff and patient report. Today's concerns are:    Concerns as lack of improvement in coccyx wound and may be worsening tunneling per nursing     Patient denies any concerns       ALLERGIES: Review of patient's allergies indicates no known allergies.  PAST MEDICAL HISTORY:  has a past medical history of Coronary artery disease; Diabetes mellitus (H); Malignant neoplasm (H); and Squamous cell carcinoma. He also has no past medical history of Basal cell carcinoma or Malignant melanoma (H).  PAST SURGICAL HISTORY:  has a past surgical history that includes Cardiac surgery; orthopedic surgery (1994); ENT surgery; appendectomy; and Eye surgery.  FAMILY HISTORY: family history includes Cancer - colorectal (age of onset: 64) in his brother.  SOCIAL HISTORY:  reports that he has quit smoking. He has never used smokeless tobacco. He reports that he drinks alcohol. He reports that he does not use illicit drugs.    MEDICATIONS:  Current Outpatient Prescriptions   Medication Sig Dispense Refill     acetaminophen (TYLENOL) 650 MG Suppository Place 650 mg rectally every 6 hours as needed for fever       ASPIRIN EC PO Take 81 mg by mouth daily       Atorvastatin Calcium (LIPITOR PO) Take 40 mg by mouth daily       bisacodyl (DULCOLAX) 10 MG Suppository Place 10 mg rectally daily as needed for constipation       Cephalexin (KEFLEX PO) Take 500 mg by mouth 2 times daily       donepezil (ARICEPT) 5 MG tablet Take 5 mg by mouth At Bedtime       morphine 2.5 MG solu-tab Take 2.5 mg by mouth every hour as needed for shortness  of breath / dyspnea or moderate to severe pain       nitroglycerin (NITROSTAT) 0.4 MG SL tablet Place  under the tongue every 5 minutes as needed.       Ondansetron HCl (ZOFRAN PO) Take 4 mg by mouth every 6 hours as needed for nausea or vomiting       pantoprazole sodium (PROTONIX) 40 MG tablet Take 1 packet by mouth daily.       sennosides (SENOKOT) 8.6 MG tablet Take 1 tablet by mouth daily as needed for constipation       SPIRONOLACTONE PO Take 25 mg by mouth daily       TORSEMIDE PO Take 20 mg by mouth daily       Medications reviewed:  Medications reconciled to facility chart and changes were made to reflect current medications as identified as above med list. Below are the changes that were made:   Medications stopped since last EPIC medication reconciliation:   There are no discontinued medications.    Medications started since last [facility] medication reconciliation:  Orders Placed This Encounter   Medications     acetaminophen (TYLENOL) 650 MG Suppository     Sig: Place 650 mg rectally every 6 hours as needed for fever     morphine 2.5 MG solu-tab     Sig: Take 2.5 mg by mouth every hour as needed for shortness of breath / dyspnea or moderate to severe pain     sennosides (SENOKOT) 8.6 MG tablet     Sig: Take 1 tablet by mouth daily as needed for constipation     bisacodyl (DULCOLAX) 10 MG Suppository     Sig: Place 10 mg rectally daily as needed for constipation     Ondansetron HCl (ZOFRAN PO)     Sig: Take 4 mg by mouth every 6 hours as needed for nausea or vomiting     Cephalexin (KEFLEX PO)     Sig: Take 500 mg by mouth 2 times daily         Case Management:  I have reviewed the care plan and MDS and do agree with the plan. Patient's desire to return to the community is present, but is not able due to care needs .  Information reviewed:  Medications, vital signs, orders, and nursing notes.    ROS:  Limited secondary to cognitive impairment but today pt reports denies any concerns    Exam:  Vitals: BP  145/65  Pulse 92  Temp 97  F (36.1  C)  Resp 19  Wt 158 lb 9.6 oz (71.9 kg)  SpO2 99%  BMI 22.76 kg/m2  BMI= Body mass index is 22.76 kg/(m^2).  GENERAL APPEARANCE:  Alert, in no distress, thin  RESP:  respiratory effort and palpation of chest normal, lungs clear to auscultation   CV:  Palpation and auscultation of heart done , regular rate and rhythm, no murmur, rub, or gallop  ABDOMEN:  normal bowel sounds, soft, nontender, no hepatosplenomegaly or other masses  M/S:   Gait and station abnormal wheelchair bound  Digits and nails normal  SKIN:  wound appearance: cocyxx wound with packing- unchanged  1.5x0.5cm with tunneling at 12 o clock measuring 0.3cm, tunneling at 3oclock measuring 0.5cm and tunneling at 9oclock measuring 0.5cm.  Superficial wound on left side of wound - consistent with tape burn  PSYCH:  memory impaired     Lab/Diagnostic data:     CBC RESULTS:   Recent Labs   Lab Test  02/19/16   1357   WBC  5.6   RBC  3.96*   HGB  11.8*   HCT  36.5*   MCV  92   MCH  29.8   MCHC  32.3   RDW  13.8   PLT  244       Last Basic Metabolic Panel:  Recent Labs   Lab Test 02/08/17 02/19/16   1357   NA   --   143   POTASSIUM  4.4  4.0   CHLORIDE   --   107   ANSHU   --   8.3*   CO2   --   30   BUN   --   28   CR  1.16  1.15   GLC  101  106*       Liver Function Studies -   Recent Labs   Lab Test 02/08/17 02/19/16   1357   PROTTOTAL   --   6.6*   ALBUMIN   --   3.5   BILITOTAL   --   0.5   ALKPHOS   --   95   AST  16  15   ALT  12  19       No results found for: TSH]    No results found for: A1C    ASSESSMENT/PLAN  BPH/Prostate cancer  Hospice care patient  Through [facility]   Long term plan is to transfer to [facility] in [place]- awaiting bed placement   Patient confirms goal of care is comfort      ASHD (arteriosclerotic heart disease)  Chronic systolic CHF (congestive heart failure) (H)  Cardiomyopathy, ischemic  He is on ASA, statin, spironolactone and torsemide   Goal is comfort      Failure to  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  Hospice care      Late onset Alzheimer's disease without behavioral disturbance  He is on aricept         Pressure injury of sacral region, stage 3  Continue wound care orders   On pressure relieving mattress   Discussed with patient need for repositioning   Wound care orders per hospice   Suspect that there will be lack of improvement in this wound due to nutritional status and failure to thrive         Type 2 diabetes mellitus with complication, without long-term current use of insulin (H)  Last A1c was 6.3 on 8/22/18  Will discontinue glipizide   Goal is comfort   Gaol A1C 8-9 due to life expectancy     Orders:  1.  Continue current coccyx wound care--make sure packing loosely    Total time spent with patient visit at the skilled nursing facility was 25 min including patient visit and review of past records. Greater than 50% of total time spent with counseling and coordinating care due to cognitive impairment.     Electronically signed by:  Laquita Valle NP

## 2018-11-14 NOTE — MR AVS SNAPSHOT
After Visit Summary   11/14/2018    Ramon Mora    MRN: [id number]           Patient Information     Date Of Birth          7/17/1933        Visit Information        Provider Department      11/14/2018 8:00 AM Laquita Valle NP [facility]        Today's Diagnoses     Hospice care patient    -  1    ASHD (arteriosclerotic heart disease)        Chronic systolic CHF (congestive heart failure) (H)        Cardiomyopathy, ischemic        Failure to 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        Pressure injury of sacral region, stage 2        Type 2 diabetes mellitus with complication, without long-term current use of insulin (H)        Benign non-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  Normal or non-critical lab and imaging results will be communicated to you by MyChart, letter or phone within 4 business days after the clinic has received the results. If you do not hear from us within 7 days, please contact the clinic through MyChart or phone. If you have a critical or abnormal lab result, we will notify you by phone as soon as possible.  Submit refill requests through [employer] or call your pharmacy and they will forward the refill request to us. Please allow 3 business days for your refill to be completed.          Additional Information About Your Visit        Care EveryWhere ID     This is your Care EveryWhere ID. This could be used by other organizations to access your [place] medical records  [id number]        Your Vitals Were     Pulse Temperature Respirations Pulse Oximetry BMI (Body Mass Index)       92 97  F (36.1  C) 19 99% 22.76 kg/m2        Blood Pressure from Last 3 Encounters:   11/13/18 145/65   10/29/18 133/79    10/16/18 109/51    Weight from Last 3 Encounters:   11/13/18 158 lb 9.6 oz (71.9 kg)   10/29/18 171 lb 12.8 oz (77.9 kg)   10/16/18 171 lb 12.8 oz (77.9 kg)              Today, you had the following     No orders found for display      Information about OPIOIDS     PRESCRIPTION OPIOIDS: WHAT YOU NEED TO KNOW   We gave you an opioid (narcotic) pain medicine. It is important to manage your pain, but opioids are not always the best choice. You should first try all the other options your care team gave you. Take this medicine for as short a time (and as few doses) as possible.    Some activities can increase your pain, such as bandage changes or therapy sessions. It may help to take your pain medicine 30 to 60 minutes before these activities. Reduce your stress by getting enough sleep, working on hobbies you enjoy and practicing relaxation or meditation. Talk to your care team about ways to manage your pain beyond prescription opioids.    These medicines have risks:    DO NOT drive when on new or higher doses of pain medicine. These medicines can affect your alertness and reaction times, and you could be arrested for driving under the influence (DUI). If you need to use opioids long-term, talk to your care team about driving.    DO NOT operate heavy machinery    DO NOT do any other dangerous activities while taking these medicines.    DO NOT drink any alcohol while taking these medicines.     If the opioid prescribed includes acetaminophen, DO NOT take with any other medicines that contain acetaminophen. Read all labels carefully. Look for the word  acetaminophen  or  Tylenol.  Ask your pharmacist if you have questions or are unsure.    You can get addicted to pain medicines, especially if you have a history of addiction (chemical, alcohol or substance dependence). Talk to your care team about ways to reduce this risk.    All opioids tend to cause constipation. Drink plenty of water and eat foods that have a lot of  fiber, such as fruits, vegetables, prune juice, apple juice and high-fiber cereal. Take a laxative (Miralax, milk of magnesia, Colace, Senna) if you don t move your bowels at least every other day. Other side effects include upset stomach, sleepiness, dizziness, throwing up, tolerance (needing more of the medicine to have the same effect), physical dependence and slowed breathing.    Store your pills in a secure place, locked if possible. We will not replace any lost or stolen medicine. If you don t finish your medicine, please throw away (dispose) as directed by your pharmacist. The [place] Pollution [employer] Agency has more information about safe disposal: [email or web address]://[email or web address].[email or web address].us/living-green/[email or web address]-unwanted-medications         Primary Care Provider Office Phone # Fax #    Rex Luna [phone]       [facility] [address] [place] [address]        Equal Access to Services     JAYCEE TENORIO : Hadii meggan raio Socelso, waaxda luqadaha, qaybta kaalmada adeconstanceyabuddy, angelika guevara . So [facility] [phone].    ATENCIÓN: Si habla español, tiene a carver disposición servicios gratuitos de asistencia lingüística. Llame al [phone].    We comply with applicable federal civil rights laws and [place] laws. We do not discriminate on the basis of race, color, national origin, age, disability, sex, sexual orientation, or gender identity.            Thank you!     Thank you for choosing [facility]  for your care. Our goal is always to provide you with excellent care. Hearing back from our patients is one way we can continue to improve our services. Please take a few minutes to complete the written survey that you may receive in the mail after your visit with us. Thank you!             Your Updated Medication List - Protect others around you: Learn how to safely use, store and throw away your medicines at www.disposemymeds.org.           This list is accurate as of 11/14/18 11:59 PM.  Always use your most recent med list.                   Brand Name Dispense Instructions for use Diagnosis    acetaminophen 650 MG Suppository    TYLENOL     Place 650 mg rectally every 6 hours as needed for fever        ASPIRIN EC PO      Take 81 mg by mouth daily        bisacodyl 10 MG Suppository    DULCOLAX     Place 10 mg rectally daily as needed for constipation        donepezil 5 MG tablet    ARIcept     Take 5 mg by mouth At Bedtime        KEFLEX PO      Take 500 mg by mouth 2 times daily        LIPITOR PO      Take 40 mg by mouth daily        morphine 2.5 MG solu-tab      Take 2.5 mg by mouth every hour as needed for shortness of breath / dyspnea or moderate to severe pain        nitroGLYcerin 0.4 MG sublingual tablet    NITROSTAT     Place  under the tongue every 5 minutes as needed.        PROTONIX 40 MG packet   Generic drug:  pantoprazole sodium      Take 1 packet by mouth daily.        sennosides 8.6 MG tablet    SENOKOT     Take 1 tablet by mouth daily as needed for constipation        SPIRONOLACTONE PO      Take 25 mg by mouth daily        TORSEMIDE PO      Take 20 mg by mouth daily        ZOFRAN PO      Take 4 mg by mouth every 6 hours as needed for nausea or vomiting

## 2018-11-14 NOTE — LETTER
11/14/2018        RE: Ramon Mora  [address] [address] [facility] [address]          [place] [facility]    Chief Complaint   Patient presents with     detention Regulatory       [place] Medical Record Number:  [id number]  Place of Service where encounter took place:  [facility] (S) [[id number]]    HPI:    Ramon Mora is a 85 year old  (7/17/1933), who is being seen today for a federally mandated E/M visit.  HPI information obtained from: facility chart records, facility staff and patient report. Today's concerns are:    Concerns as lack of improvement in coccyx wound and may be worsening tunneling per nursing     Patient denies any concerns       ALLERGIES: Review of patient's allergies indicates no known allergies.  PAST MEDICAL HISTORY:  has a past medical history of Coronary artery disease; Diabetes mellitus (H); Malignant neoplasm (H); and Squamous cell carcinoma. He also has no past medical history of Basal cell carcinoma or Malignant melanoma (H).  PAST SURGICAL HISTORY:  has a past surgical history that includes Cardiac surgery; orthopedic surgery (1994); ENT surgery; appendectomy; and Eye surgery.  FAMILY HISTORY: family history includes Cancer - colorectal (age of onset: 64) in his brother.  SOCIAL HISTORY:  reports that he has quit smoking. He has never used smokeless tobacco. He reports that he drinks alcohol. He reports that he does not use illicit drugs.    MEDICATIONS:  Current Outpatient Prescriptions   Medication Sig Dispense Refill     acetaminophen (TYLENOL) 650 MG Suppository Place 650 mg rectally every 6 hours as needed for fever       ASPIRIN EC PO Take 81 mg by mouth daily       Atorvastatin Calcium (LIPITOR PO) Take 40 mg by mouth daily       bisacodyl (DULCOLAX) 10 MG Suppository Place 10 mg rectally daily as needed for constipation       Cephalexin (KEFLEX PO) Take 500 mg by mouth 2 times daily       donepezil (ARICEPT) 5 MG tablet Take 5 mg by mouth  At Bedtime       morphine 2.5 MG solu-tab Take 2.5 mg by mouth every hour as needed for shortness of breath / dyspnea or moderate to severe pain       nitroglycerin (NITROSTAT) 0.4 MG SL tablet Place  under the tongue every 5 minutes as needed.       Ondansetron HCl (ZOFRAN PO) Take 4 mg by mouth every 6 hours as needed for nausea or vomiting       pantoprazole sodium (PROTONIX) 40 MG tablet Take 1 packet by mouth daily.       sennosides (SENOKOT) 8.6 MG tablet Take 1 tablet by mouth daily as needed for constipation       SPIRONOLACTONE PO Take 25 mg by mouth daily       TORSEMIDE PO Take 20 mg by mouth daily       Medications reviewed:  Medications reconciled to facility chart and changes were made to reflect current medications as identified as above med list. Below are the changes that were made:   Medications stopped since last EPIC medication reconciliation:   There are no discontinued medications.    Medications started since last [facility] medication reconciliation:  Orders Placed This Encounter   Medications     acetaminophen (TYLENOL) 650 MG Suppository     Sig: Place 650 mg rectally every 6 hours as needed for fever     morphine 2.5 MG solu-tab     Sig: Take 2.5 mg by mouth every hour as needed for shortness of breath / dyspnea or moderate to severe pain     sennosides (SENOKOT) 8.6 MG tablet     Sig: Take 1 tablet by mouth daily as needed for constipation     bisacodyl (DULCOLAX) 10 MG Suppository     Sig: Place 10 mg rectally daily as needed for constipation     Ondansetron HCl (ZOFRAN PO)     Sig: Take 4 mg by mouth every 6 hours as needed for nausea or vomiting     Cephalexin (KEFLEX PO)     Sig: Take 500 mg by mouth 2 times daily         Case Management:  I have reviewed the care plan and MDS and do agree with the plan. Patient's desire to return to the community is present, but is not able due to care needs .  Information reviewed:  Medications, vital signs, orders, and nursing notes.    ROS:  Limited  secondary to cognitive impairment but today pt reports denies any concerns    Exam:  Vitals: /65  Pulse 92  Temp 97  F (36.1  C)  Resp 19  Wt 158 lb 9.6 oz (71.9 kg)  SpO2 99%  BMI 22.76 kg/m2  BMI= Body mass index is 22.76 kg/(m^2).  GENERAL APPEARANCE:  Alert, in no distress, thin  RESP:  respiratory effort and palpation of chest normal, lungs clear to auscultation   CV:  Palpation and auscultation of heart done , regular rate and rhythm, no murmur, rub, or gallop  ABDOMEN:  normal bowel sounds, soft, nontender, no hepatosplenomegaly or other masses  M/S:   Gait and station abnormal wheelchair bound  Digits and nails normal  SKIN:  wound appearance: cocyxx wound with packing- unchanged  1.5x0.5cm with tunneling at 12 o clock measuring 0.3cm, tunneling at 3oclock measuring 0.5cm and tunneling at 9oclock measuring 0.5cm.  Superficial wound on left side of wound - consistent with tape burn  PSYCH:  memory impaired     Lab/Diagnostic data:     CBC RESULTS:   Recent Labs   Lab Test  02/19/16   1357   WBC  5.6   RBC  3.96*   HGB  11.8*   HCT  36.5*   MCV  92   MCH  29.8   MCHC  32.3   RDW  13.8   PLT  244       Last Basic Metabolic Panel:  Recent Labs   Lab Test 02/08/17 02/19/16   1357   NA   --   143   POTASSIUM  4.4  4.0   CHLORIDE   --   107   ANSHU   --   8.3*   CO2   --   30   BUN   --   28   CR  1.16  1.15   GLC  101  106*       Liver Function Studies -   Recent Labs   Lab Test 02/08/17 02/19/16   1357   PROTTOTAL   --   6.6*   ALBUMIN   --   3.5   BILITOTAL   --   0.5   ALKPHOS   --   95   AST  16  15   ALT  12  19       No results found for: TSH]    No results found for: A1C    ASSESSMENT/PLAN  BPH/Prostate cancer  Hospice care patient  Through [facility]   Long term plan is to transfer to [facility] in [place]- awaiting bed placement   Patient confirms goal of care is comfort      ASHD (arteriosclerotic heart disease)  Chronic systolic CHF (congestive heart failure) (H)  Cardiomyopathy,  ischemic  He is on ASA, statin, spironolactone and torsemide   Goal is comfort      Failure to 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  Hospice care      Late onset Alzheimer's disease without behavioral disturbance  He is on aricept         Pressure injury of sacral region, stage 3  Continue wound care orders   On pressure relieving mattress   Discussed with patient need for repositioning   Wound care orders per hospice   Suspect that there will be lack of improvement in this wound due to nutritional status and failure to thrive         Type 2 diabetes mellitus with complication, without long-term current use of insulin (H)  Last A1c was 6.3 on 8/22/18  Will discontinue glipizide   Goal is comfort   Gaol A1C 8-9 due to life expectancy     Orders:  1.  Continue current coccyx wound care--make sure packing loosely    Total time spent with patient visit at the skilled nursing facility was 25 min including patient visit and review of past records. Greater than 50% of total time spent with counseling and coordinating care due to cognitive impairment.     Electronically signed by:  Laquita Valle NP        Sincerely,        Laquita Valle NP

## 2018-12-06 NOTE — LETTER
12/6/2018        RE: Ramon Mora  [address] [address] [address]        [place] [facility]    Chief Complaint   Patient presents with     [facility]       [place] Medical Record Number:  [id number]  Place of Service where encounter took place:  [facility] (S) [[id number]]    HPI:    Ramon Mora is a 85 year old  (7/17/1933), who is being seen today for an episodic care visit.  HPI information obtained from: facility chart records, facility staff and patient report. Today's concern is:    Nursing requested visit due to wound not healing   Patient is enrolled in hospice with [facility] hospice for adult failure to thrive, hx prostate cancer, alzheimer's  He was recently treated with keflex for wound infection- this is resolved     Patient is [facility] for a bed [facility] [place]     REVIEW OF SYSTEMS:  Unobtainable secondary to cognitive impairment.     /58  Pulse 79  Temp 98  F (36.7  C)  Resp 16  Wt 149 lb 12.8 oz (67.9 kg)  SpO2 95%  BMI 21.49 kg/m2  GENERAL APPEARANCE:  Alert, in no distress, very thin and frail   RESP:  respiratory effort and palpation of chest normal, auscultation of lungs clear , no respiratory distress  CV:  Palpation and auscultation of heart done , rate and rhythm WNL, no murmur, no peripheral edema  ABDOMEN:  normal bowel sounds, soft, nontender, no hepatosplenomegaly or other masses  M/S:   Gait and station wheelchair bound, Digits and nails intact  NEURO: 2-12 in normal limits and at patient's baseline  PSYCH:  insight and judgement, memory impaired , affect and mood normal  SKIN:  Inspection and Palpation of skin and subcutaneous tissue   Wound on coccyx is measuring 2x1cm with tunneling at 12 o clock measuring 0.1cm, tunneling at 3oclock measuring 0.1cm and tunneling at 9oclock measuring 0.4cm. Wound bed is well granulated  There is no odor or drainage noted   Surrounding skin is intact         ASSESSMENT/PLAN:      Pressure injury of sacral region, stage 3 (H)  Failure to 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     This would will be difficult to heal due to fraility and nutritional status- will change wound care to below      Orders:  1.  New coccyx wound care orders--cleanse wound with wound cleanser and fill wound with intrasite wound gel.  Apply skin prep to surrounding skin and cover with hydrocolliod dressing.  Change every other day and PRN.    Total time spent with patient visit at the skilled nursing facility was 15 min including patient visit and review of past records. Greater than 50% of total time spent with counseling and coordinating care due to impaired cognition    Electronically signed by:  Laquita Valle NP      Sincerely,        Laquita Valle NP

## 2018-12-06 NOTE — PROGRESS NOTES
[place] [facility]    Chief Complaint   Patient presents with     retirement Acute       [place] Medical Record Number:  [id number]  Place of Service where encounter took place:  [facility] ([facility]) [[id number]]    HPI:    Ramon Mora is a 85 year old  (7/17/1933), who is being seen today for an episodic care visit.  HPI information obtained from: facility chart records, facility staff and patient report. Today's concern is:    Nursing requested visit due to wound not healing   Patient is enrolled in hospice with [facility] for adult failure to thrive, hx prostate cancer, alzheimer's  He was recently treated with keflex for wound infection- this is resolved     Patient is waiting for a bed at [facility] in [place]     REVIEW OF SYSTEMS:  Unobtainable secondary to cognitive impairment.     /58  Pulse 79  Temp 98  F (36.7  C)  Resp 16  Wt 149 lb 12.8 oz (67.9 kg)  SpO2 95%  BMI 21.49 kg/m2  GENERAL APPEARANCE:  Alert, in no distress, very thin and frail   RESP:  respiratory effort and palpation of chest normal, auscultation of lungs clear , no respiratory distress  CV:  Palpation and auscultation of heart done , rate and rhythm WNL, no murmur, no peripheral edema  ABDOMEN:  normal bowel sounds, soft, nontender, no hepatosplenomegaly or other masses  M/S:   Gait and station wheelchair bound, Digits and nails intact  NEURO: 2-12 in normal limits and at patient's baseline  PSYCH:  insight and judgement, memory impaired , affect and mood normal  SKIN:  Inspection and Palpation of skin and subcutaneous tissue   Wound on coccyx is measuring 2x1cm with tunneling at 12 o clock measuring 0.1cm, tunneling at 3oclock measuring 0.1cm and tunneling at 9oclock measuring 0.4cm. Wound bed is well granulated  There is no odor or drainage noted   Surrounding skin is intact         ASSESSMENT/PLAN:     Pressure injury of sacral region, stage 3 (H)  Failure to 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     This would will  be difficult to heal due to fraility and nutritional status- will change wound care to below      Orders:  1.  New coccyx wound care orders--cleanse wound with wound cleanser and fill wound with intrasite wound gel.  Apply skin prep to surrounding skin and cover with hydrocolliod dressing.  Change every other day and PRN.    Total time spent with patient visit at the skilled nursing facility was 15 min including patient visit and review of past records. Greater than 50% of total time spent with counseling and coordinating care due to impaired cognition    Electronically signed by:  Laquita Valle NP

## 2018-12-11 NOTE — PROGRESS NOTES
[place] [facility] DISCHARGE SUMMARY    PATIENT'S NAME: Ramon Mora  YOB: 1933  MEDICAL RECORD NUMBER:  [id number]  Place of Service where encounter took place:  No question data found.    PRIMARY CARE PROVIDER AND CLINIC RESPONSIBLE AFTER TRANSFER: [facility] [address] [facility] [address]     TRANSFERRING PROVIDERS: Laquita Valle NP, Dr. Fifi Powell MD  DATE OF SNF ADMISSION:  October / 11 / 2018  DATE OF SNF (anticipated) DISCHARGE: December / 12 / 2018  DISCHARGE DISPOSITION: Nursing Home: [facility] ([facility])   RECENT HOSPITALIZATION/ED:  [facility] stay 10/7/18 to 10/11/18.     CODE STATUS/ADVANCE DIRECTIVES DISCUSSION:   DNR / DNI   No Known Allergies  Condition on Discharge:  Stable.  Function:  Up with assist/wheelchair bound  Cognitive Scores: BIMS 14/15    Equipment: walker and wheelchair    DISCHARGE DIAGNOSIS:   1. Failure to 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    2. Prostate cancer (H)    3. CKD (chronic kidney disease), stage III (H)    4. Chronic systolic CHF (congestive heart failure) (H)    5. Parkinson's disease (H)    6. Late onset Alzheimer's disease without behavioral disturbance    7. Type 2 diabetes mellitus with complication, without long-term current use of insulin (H)    8. S/P TAVR (transcatheter aortic valve replacement)    9. ASHD (arteriosclerotic heart disease)    10. Hospice care patient        HPI Nursing Facility Course:  HPI information obtained from: facility chart records, facility staff, patient report and [facility] chart review.    BRIEF HOSPITAL COURSE: This patient Ramon Mora is a 85 y.o. male who has a history of chronic systolic CHF, TAVR, ischemic cardiomyopathy, right carotid artery bruit, CKD, GERD, Alzheimer's, parkinson, prostate cancer, DM 2 who presented by ambulance to [facility] on 10/7/2018 due to weakness. In the emergency department, vitals were stable. UA  negative. Wbc 9.2, hgb 12.0. TSH 1.17. BUN 40, creatinine 1.83, sodium 139. Lactate 0.6. AST 62. PSA on 8/22/18 noted to be 18.9 on chart review. He underwent a CT scan of his abdomen/pelvis/chest. Please see reports below. He did have a significant left pleural effusion with left upper lobe opacity. He did undergo a diagnostic thoracentesis on 10/9/2018 which removed 800 cc of fluid. He had a repeat cardiac echo which revealed a decreased EF of approximately 15%. This is a change from his previous 25% in 2017. During his hospital stay his creatinine improved from an admission 1.83 to 1.27 to 1.13. Potassium was stable at 4.1. No early morning on 10/10/2018 the patient sustained some chest discomfort which improved with nitro. His EKG showed a left bundle branch block. His troponin was elevated at 4.79. The day prior to this chest pain and cardiac event the patient and family had a transitional care conference and agreed to attempt transitional care and rehab. After his cardiac event on 10/10/2018 the patient and family agreed to nursing home placement on hospice cares. The patient has had hypotension despite IV fluids and holding his blood pressure medications. He did not appear to be septic. The patient was then discharged to a nursing home on hospice cares on 10/11/2018. The patient appeared comfortable. The patient was thankful and understanding of hospice cares and his current diagnoses. The majority of his blood pressure meds were held at time of discharge. His Demadex dose was decreased due to lower blood pressures. Patient family were understanding of the plan and in agreement.    He was discharged to [facility] with hospice involvement through [facility] until a transfer to [facility] could be facilitated. His stay at [facility] was uneventful. He has a open area on his coccyx that has not worsened but is not improving. New orders for intrasite gel was started last week and per  "nursing this has been working well.        PAST MEDICAL HISTORY:  has a past medical history of Coronary artery disease, Diabetes mellitus (H), Malignant neoplasm (H), and Squamous cell carcinoma. He also has no past medical history of Basal cell carcinoma or Malignant melanoma (H).    DISCHARGE MEDICATIONS:  Current Outpatient Medications   Medication Sig Dispense Refill     acetaminophen (TYLENOL) 650 MG Suppository Place 650 mg rectally every 6 hours as needed for fever       ASPIRIN EC PO Take 81 mg by mouth daily       bisacodyl (DULCOLAX) 10 MG Suppository Place 10 mg rectally daily as needed for constipation       morphine 2.5 MG solu-tab Take 2.5 mg by mouth every hour as needed for shortness of breath / dyspnea or moderate to severe pain       nitroglycerin (NITROSTAT) 0.4 MG SL tablet Place  under the tongue every 5 minutes as needed.       Ondansetron HCl (ZOFRAN PO) Take 4 mg by mouth every 6 hours as needed for nausea or vomiting       pantoprazole sodium (PROTONIX) 40 MG tablet Take 1 packet by mouth daily.       sennosides (SENOKOT) 8.6 MG tablet Take 1 tablet by mouth 2 times daily And Daily PRN       SPIRONOLACTONE PO Take 25 mg by mouth daily       TORSEMIDE PO Take 20 mg by mouth daily         MEDICATION CHANGES/RATIONALE:   atorvastatin and Aricept d/c'ed by hospice due to goals of care      Controlled medications sent with patient:   Medication: morphine  , 30 tabs given to patient at the time of discharge to take home     ROS:    4 point ROS including Respiratory, CV, GI and , other than that noted in the HPI,  is negative    Physical Exam:   Vitals: /55   Pulse 71   Temp 96.7  F (35.9  C)   Resp 18   Ht 1.778 m (5' 10\")   Wt 68.4 kg (150 lb 11.2 oz)   SpO2 98%   BMI 21.62 kg/m    BMI= Body mass index is 21.62 kg/m .    GENERAL APPEARANCE:  Alert, in no distress, thin, frail  RESP:  respiratory effort and palpation of chest normal, lungs clear to auscultation , no respiratory " distress  CV:  Palpation and auscultation of heart done , regular rate and rhythm, no murmur, rub, or gallop  ABDOMEN:  normal bowel sounds, soft, nontender, no hepatosplenomegaly or other masses  NEURO:   Cranial nerves 2-12 are normal tested and grossly at patient's baseline  PSYCH:  memory impaired- mild , affect and mood normal    DISCHARGE PLAN:  Hospice  Patient instructed to follow-up with:  NP at [facility]  For admission      [facility] scheduled appointments:  No future appointments.    MTM referral needed and placed by this provider: No    Pending labs:   [facility] labs  CBC RESULTS:   Recent Labs   Lab Test 02/19/16  1357   WBC 5.6   RBC 3.96*   HGB 11.8*   HCT 36.5*   MCV 92   MCH 29.8   MCHC 32.3   RDW 13.8          Last Basic Metabolic Panel:  Recent Labs   Lab Test 02/08/17 02/19/16  1357   NA  --  143   POTASSIUM 4.4 4.0   CHLORIDE  --  107   ANSHU  --  8.3*   CO2  --  30   BUN  --  28   CR 1.16 1.15    106*       Liver Function Studies -   Recent Labs   Lab Test 02/08/17 02/19/16  1357   PROTTOTAL  --  6.6*   ALBUMIN  --  3.5   BILITOTAL  --  0.5   ALKPHOS  --  95   AST 16 15   ALT 12 19       Discharge Treatments: no change in tx     Wound care to coccyx ulcer: New coccyx wound care orders--cleanse wound with wound cleanser and fill wound with intrasite wound gel.  Apply skin prep to surrounding skin and cover with hydrocolliod dressing.  Change every other day and PRN.    TOTAL DISCHARGE TIME:   Greater than 30 minutes  Electronically signed by:  Laquita Valle NP

## 2018-12-18 NOTE — PROGRESS NOTES
[place] [facility]  PRIMARY CARE PROVIDER AND CLINIC:  [facility] [address] [facility] [address]*  Chief Complaint   Patient presents with     [facility] Care     [place] Medical Record Number:  [id number]  Place of Service where encounter took place:  [facility] ([facility]) [[id number]]    HPI:    Ramon Mora is a 85 year old  (7/17/1933),admitted to the above facility from  Home.  Admitted to this facility for  nursing care and hospice.  HPI information obtained from: facility chart records, facility staff, patient report and [facility] chart review.  Current issues are:      Patient with medical history significant for Parkinson disease, heart, dementia, prostate cancer, myocardial infarction.  Patient transferring to [facility] from [place] for [facility]note 3[facility] contract hospice services.  Has been in the long-term care facility since mid October status post a hospitalization for failure to thrive, increased weakness, decreased heart function, hospice service.  Patient offers no concerns today.  States appetite, sleep, and urine and bowel movements are fine.  Does admit to little energy.  Denies chest pain, shortness of breath or muscular skeletal pain.    CODE STATUS/ADVANCE DIRECTIVES DISCUSSION:   DNR / DNI  Patient's living condition: lives alone    ALLERGIES:Patient has no known allergies.  PAST MEDICAL HISTORY:  has a past medical history of Coronary artery disease, Diabetes mellitus (H), Malignant neoplasm (H), and Squamous cell carcinoma. He also has no past medical history of Basal cell carcinoma or Malignant melanoma (H).  PAST SURGICAL HISTORY:  has a past surgical history that includes Cardiac surgery; orthopedic surgery (1994); ENT surgery; appendectomy; and Eye surgery.  FAMILY HISTORY: family history includes Cancer - colorectal (age of onset: 64) in his brother.  SOCIAL HISTORY:  reports that he has quit smoking. he has never used  smokeless tobacco. He reports that he drinks alcohol. He reports that he does not use drugs.    Post Discharge Medication Reconciliation Status: discharge medications reconciled and changed, per note/orders (see AVS).  Current Outpatient Medications   Medication Sig Dispense Refill     acetaminophen (TYLENOL) 650 MG Suppository Place 650 mg rectally every 6 hours as needed for fever       ASPIRIN EC PO Take 81 mg by mouth daily       bisacodyl (DULCOLAX) 10 MG Suppository Place 10 mg rectally daily as needed for constipation       nitroglycerin (NITROSTAT) 0.4 MG SL tablet Place  under the tongue every 5 minutes as needed.       nystatin (MYCOSTATIN) 380757 UNIT/GM external powder        omeprazole (PRILOSEC OTC) 20 MG EC tablet Take 20 mg by mouth daily       sennosides (SENOKOT) 8.6 MG tablet Take 1 tablet by mouth 2 times daily And Daily PRN       SPIRONOLACTONE PO Take 25 mg by mouth daily       TORSEMIDE PO Take 20 mg by mouth daily         ROS:  10 point ROS of systems including Constitutional, Eyes, Respiratory, Cardiovascular, Gastroenterology, Genitourinary, Integumentary, Musculoskeletal, Psychiatric were all negative except for pertinent positives noted in my HPI.    Exam:  /47   Pulse 81   Temp 97.9  F (36.6  C)   Resp 16   Wt 68 kg (150 lb)   SpO2 94%   BMI 21.52 kg/m    GENERAL APPEARANCE:  Alert, in no distress, Resting comfortably in bed  ENT:  Mouth and posterior oropharynx normal, moist mucous membranes  RESP:  lungs clear to auscultation , no respiratory distress  CV:  Regular S1-S2.  Heart rate irregularly irregular.  No murmur.  ABDOMEN:  no guarding or rebound, bowel sounds normal  M/S:   Generalized weakness.  Wheelchair-bound  SKIN:  Pressure injury over coccyx and left buttocks.  Previously had been staged at stage IV and III.  However now covered with slough and unstageable  NEURO:   No focal deficits  PSYCH:  memory impaired , affect and mood normal    Lab/Diagnostic data: All  outside labs reviewed.         ASSESSMENT/PLAN:  Chronic systolic congestive heart failure (H)  Coronary artery disease involving native coronary artery of native heart without angina pectoris  S/P TAVR (transcatheter aortic valve replacement)  Ejection fracture 25% year ago now 10-20%.  Had [facility] Y[facility]DA in mid October.  Goal is comfort.  Remains on an aspirin statin Spironolactone, torsemide and oxygen as needed.  Would suggest GDR of statin and aspirin.  Hospice to manage meds    Parkinsonism with orthostatic hypotension (H)  Late onset Alzheimer's disease without behavioral disturbance  Failure to 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  Has had decline over the past few months to the point of needing 24-hour care.  Appropriate for long-term care.  Requires extensive assistance for all activities of daily living.  Continue to anticipate needs and provide redirection and reassurance as necessary.  Goal is comfort    Prostate cancer (H)  Obstructive uropathy  Had been treated with radiation but market increase in PSA recently.  Uncertain if this could be contributing to his decline  Requires Sarah catheter    Decubitus ulcer of coccygeal region, unstageable (H)  Meticulous wound assessment and care per nursing staff daily.  Offloading of pressure.  Unfortunately due to patient's overall declining condition, failure to thrive, reduced circulation, end of life, anticipate that these wounds not only will not heal but will probably worsen despite all interventions.       Orders:  1. NNO    Total time spent with patient visit at the skilled nursing facility was 35 min including patient visit and review of past records. Greater than 50% of total time spent with counseling and coordinating care due to Admission    Electronically signed by:  ANNABELLE Michelle CNP

## 2018-12-18 NOTE — LETTER
12/18/2018        RE: Ramon Mora  [id number] [address] [address]        [place] [facility]  PRIMARY CARE PROVIDER AND CLINIC:  [facility] [address] [facility] [address]*  Chief Complaint   Patient presents with     [facility] Care     [place] Medical Record Number:  [id number]  Place of Service where encounter took place:  [facility] ([facility]) [[id number]]    HPI:    Ramon Mora is a 85 year old  (7/17/1933),admitted to the above facility from  Home.  Admitted to this facility for  nursing care and hospice.  HPI information obtained from: facility chart records, facility staff, patient report and [facility] chart review.  Current issues are:      Patient with medical history significant for Parkinson disease, heart, dementia, prostate cancer, myocardial infarction.  Patient transferring to [facility] from [place] for [facility] contract hosp[facility].  Has been in the long-term care facility since mid October status post a hospitalization for failure to thrive, increased weakness, decreased heart function, hospice service.  Patient offers no concerns today.  States appetite, sleep, and urine and bowel movements are fine.  Does admit to little energy.  Denies chest pain, shortness of breath or muscular skeletal pain.    CODE STATUS/ADVANCE DIRECTIVES DISCUSSION:   DNR / DNI  Patient's living condition: lives alone    ALLERGIES:Patient has no known allergies.  PAST MEDICAL HISTORY:  has a past medical history of Coronary artery disease, Diabetes mellitus (H), Malignant neoplasm (H), and Squamous cell carcinoma. He also has no past medical history of Basal cell carcinoma or Malignant melanoma (H).  PAST SURGICAL HISTORY:  has a past surgical history that includes Cardiac surgery; orthopedic surgery (1994); ENT surgery; appendectomy; and Eye surgery.  FAMILY HISTORY: family history includes Cancer - colorectal (age of  onset: 64) in his brother.  SOCIAL HISTORY:  reports that he has quit smoking. he has never used smokeless tobacco. He reports that he drinks alcohol. He reports that he does not use drugs.    Post Discharge Medication Reconciliation Status: discharge medications reconciled and changed, per note/orders (see AVS).  Current Outpatient Medications   Medication Sig Dispense Refill     acetaminophen (TYLENOL) 650 MG Suppository Place 650 mg rectally every 6 hours as needed for fever       ASPIRIN EC PO Take 81 mg by mouth daily       bisacodyl (DULCOLAX) 10 MG Suppository Place 10 mg rectally daily as needed for constipation       nitroglycerin (NITROSTAT) 0.4 MG SL tablet Place  under the tongue every 5 minutes as needed.       nystatin (MYCOSTATIN) 986358 UNIT/GM external powder        omeprazole (PRILOSEC OTC) 20 MG EC tablet Take 20 mg by mouth daily       sennosides (SENOKOT) 8.6 MG tablet Take 1 tablet by mouth 2 times daily And Daily PRN       SPIRONOLACTONE PO Take 25 mg by mouth daily       TORSEMIDE PO Take 20 mg by mouth daily         ROS:  10 point ROS of systems including Constitutional, Eyes, Respiratory, Cardiovascular, Gastroenterology, Genitourinary, Integumentary, Musculoskeletal, Psychiatric were all negative except for pertinent positives noted in my HPI.    Exam:  /47   Pulse 81   Temp 97.9  F (36.6  C)   Resp 16   Wt 68 kg (150 lb)   SpO2 94%   BMI 21.52 kg/m     GENERAL APPEARANCE:  Alert, in no distress, Resting comfortably in bed  ENT:  Mouth and posterior oropharynx normal, moist mucous membranes  RESP:  lungs clear to auscultation , no respiratory distress  CV:  Regular S1-S2.  Heart rate irregularly irregular.  No murmur.  ABDOMEN:  no guarding or rebound, bowel sounds normal  M/S:   Generalized weakness.  Wheelchair-bound  SKIN:  Pressure injury over coccyx and left buttocks.  Previously had been staged at stage IV and III.  However now covered with slough and unstageable  NEURO:    No focal deficits  PSYCH:  memory impaired , affect and mood normal    Lab/Diagnostic data: All outside labs reviewed.         ASSESSMENT/PLAN:  Chronic systolic congestive heart failure (H)  Coronary artery disease involving native coronary artery of native heart without angina pectoris  S/P TAVR (transcatheter aortic valve replacement)  Ejection fracture 25% year ago now 10-20%.  Had ST YOLANDA in mid October.  Goal is comfort.  Remains on an aspirin statin Spironolactone, torsemide and oxygen as needed.  Would suggest GDR of statin and aspirin.  Hospice to manage meds    Parkinsonism with orthostatic hypotension (H)  Late onset Alzheimer's disease without behavioral disturbance  Failure to 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  Has had decline over the past few months to the point of needing 24-hour care.  Appropriate for long-term care.  Requires extensive assistance for all activities of daily living.  Continue to anticipate needs and provide redirection and reassurance as necessary.  Goal is comfort    Prostate cancer (H)  Had been treated with radiation but market increase in PSA recently.  Uncertain if this could be contributing to his decline    Decubitus ulcer of coccygeal region, unstageable (H)  Meticulous wound assessment and care per nursing staff daily.  Offloading of pressure.  Unfortunately due to patient's overall declining condition, failure to thrive, reduced circulation, end of life, anticipate that these wounds not only will not heal but will probably worsen despite all interventions.       Orders:  1. NNO    Total time spent with patient visit at the skilled nursing facility was 35 min including patient visit and review of past records. Greater than 50% of total time spent with counseling and coordinating care due to Admission    Electronically signed by:  ANNABELLE Michelle CNP                    Sincerely,        ANNABELLE Michelle CNP